# Patient Record
Sex: MALE | Race: OTHER | NOT HISPANIC OR LATINO | URBAN - METROPOLITAN AREA
[De-identification: names, ages, dates, MRNs, and addresses within clinical notes are randomized per-mention and may not be internally consistent; named-entity substitution may affect disease eponyms.]

---

## 2020-07-10 ENCOUNTER — INPATIENT (INPATIENT)
Facility: HOSPITAL | Age: 50
LOS: 2 days | Discharge: HOME | DRG: 313 | End: 2020-07-13
Attending: INTERNAL MEDICINE | Admitting: INTERNAL MEDICINE
Payer: COMMERCIAL

## 2020-07-10 VITALS
HEART RATE: 90 BPM | RESPIRATION RATE: 17 BRPM | TEMPERATURE: 98 F | OXYGEN SATURATION: 99 % | WEIGHT: 167.99 LBS | SYSTOLIC BLOOD PRESSURE: 153 MMHG | HEIGHT: 66 IN | DIASTOLIC BLOOD PRESSURE: 93 MMHG

## 2020-07-10 DIAGNOSIS — I20.9 ANGINA PECTORIS, UNSPECIFIED: ICD-10-CM

## 2020-07-10 DIAGNOSIS — R20.0 ANESTHESIA OF SKIN: ICD-10-CM

## 2020-07-10 DIAGNOSIS — Z98.890 OTHER SPECIFIED POSTPROCEDURAL STATES: Chronic | ICD-10-CM

## 2020-07-10 DIAGNOSIS — I24.9 ACUTE ISCHEMIC HEART DISEASE, UNSPECIFIED: ICD-10-CM

## 2020-07-10 DIAGNOSIS — Z29.9 ENCOUNTER FOR PROPHYLACTIC MEASURES, UNSPECIFIED: ICD-10-CM

## 2020-07-10 LAB
ALBUMIN SERPL ELPH-MCNC: 3.7 G/DL — SIGNIFICANT CHANGE UP (ref 3.5–5)
ALP SERPL-CCNC: 61 U/L — SIGNIFICANT CHANGE UP (ref 40–120)
ALT FLD-CCNC: 36 U/L DA — SIGNIFICANT CHANGE UP (ref 10–60)
ANION GAP SERPL CALC-SCNC: 6 MMOL/L — SIGNIFICANT CHANGE UP (ref 5–17)
APTT BLD: 29.7 SEC — SIGNIFICANT CHANGE UP (ref 27.5–35.5)
AST SERPL-CCNC: 23 U/L — SIGNIFICANT CHANGE UP (ref 10–40)
BASOPHILS # BLD AUTO: 0.05 K/UL — SIGNIFICANT CHANGE UP (ref 0–0.2)
BASOPHILS NFR BLD AUTO: 0.8 % — SIGNIFICANT CHANGE UP (ref 0–2)
BILIRUB SERPL-MCNC: 0.5 MG/DL — SIGNIFICANT CHANGE UP (ref 0.2–1.2)
BUN SERPL-MCNC: 14 MG/DL — SIGNIFICANT CHANGE UP (ref 7–18)
CALCIUM SERPL-MCNC: 8.3 MG/DL — LOW (ref 8.4–10.5)
CHLORIDE SERPL-SCNC: 107 MMOL/L — SIGNIFICANT CHANGE UP (ref 96–108)
CK MB BLD-MCNC: 0.4 % — SIGNIFICANT CHANGE UP (ref 0–3.5)
CK MB BLD-MCNC: 0.7 % — SIGNIFICANT CHANGE UP (ref 0–3.5)
CK MB CFR SERPL CALC: 1.2 NG/ML — SIGNIFICANT CHANGE UP (ref 0–3.6)
CK MB CFR SERPL CALC: 1.9 NG/ML — SIGNIFICANT CHANGE UP (ref 0–3.6)
CK SERPL-CCNC: 258 U/L — HIGH (ref 35–232)
CK SERPL-CCNC: 283 U/L — HIGH (ref 35–232)
CO2 SERPL-SCNC: 26 MMOL/L — SIGNIFICANT CHANGE UP (ref 22–31)
CREAT SERPL-MCNC: 0.96 MG/DL — SIGNIFICANT CHANGE UP (ref 0.5–1.3)
EOSINOPHIL # BLD AUTO: 0.11 K/UL — SIGNIFICANT CHANGE UP (ref 0–0.5)
EOSINOPHIL NFR BLD AUTO: 1.7 % — SIGNIFICANT CHANGE UP (ref 0–6)
GLUCOSE SERPL-MCNC: 143 MG/DL — HIGH (ref 70–99)
HCT VFR BLD CALC: 42.8 % — SIGNIFICANT CHANGE UP (ref 39–50)
HGB BLD-MCNC: 14.3 G/DL — SIGNIFICANT CHANGE UP (ref 13–17)
IMM GRANULOCYTES NFR BLD AUTO: 0.3 % — SIGNIFICANT CHANGE UP (ref 0–1.5)
INR BLD: 1.08 RATIO — SIGNIFICANT CHANGE UP (ref 0.88–1.16)
LYMPHOCYTES # BLD AUTO: 1.34 K/UL — SIGNIFICANT CHANGE UP (ref 1–3.3)
LYMPHOCYTES # BLD AUTO: 20.5 % — SIGNIFICANT CHANGE UP (ref 13–44)
MCHC RBC-ENTMCNC: 28.2 PG — SIGNIFICANT CHANGE UP (ref 27–34)
MCHC RBC-ENTMCNC: 33.4 GM/DL — SIGNIFICANT CHANGE UP (ref 32–36)
MCV RBC AUTO: 84.4 FL — SIGNIFICANT CHANGE UP (ref 80–100)
MONOCYTES # BLD AUTO: 0.3 K/UL — SIGNIFICANT CHANGE UP (ref 0–0.9)
MONOCYTES NFR BLD AUTO: 4.6 % — SIGNIFICANT CHANGE UP (ref 2–14)
NEUTROPHILS # BLD AUTO: 4.72 K/UL — SIGNIFICANT CHANGE UP (ref 1.8–7.4)
NEUTROPHILS NFR BLD AUTO: 72.1 % — SIGNIFICANT CHANGE UP (ref 43–77)
NRBC # BLD: 0 /100 WBCS — SIGNIFICANT CHANGE UP (ref 0–0)
PLATELET # BLD AUTO: 200 K/UL — SIGNIFICANT CHANGE UP (ref 150–400)
POTASSIUM SERPL-MCNC: 3.7 MMOL/L — SIGNIFICANT CHANGE UP (ref 3.5–5.3)
POTASSIUM SERPL-SCNC: 3.7 MMOL/L — SIGNIFICANT CHANGE UP (ref 3.5–5.3)
PROT SERPL-MCNC: 7.4 G/DL — SIGNIFICANT CHANGE UP (ref 6–8.3)
PROTHROM AB SERPL-ACNC: 12.6 SEC — SIGNIFICANT CHANGE UP (ref 10.6–13.6)
RBC # BLD: 5.07 M/UL — SIGNIFICANT CHANGE UP (ref 4.2–5.8)
RBC # FLD: 13.2 % — SIGNIFICANT CHANGE UP (ref 10.3–14.5)
SODIUM SERPL-SCNC: 139 MMOL/L — SIGNIFICANT CHANGE UP (ref 135–145)
TROPONIN I SERPL-MCNC: <0.015 NG/ML — SIGNIFICANT CHANGE UP (ref 0–0.04)
TROPONIN I SERPL-MCNC: <0.015 NG/ML — SIGNIFICANT CHANGE UP (ref 0–0.04)
WBC # BLD: 6.54 K/UL — SIGNIFICANT CHANGE UP (ref 3.8–10.5)
WBC # FLD AUTO: 6.54 K/UL — SIGNIFICANT CHANGE UP (ref 3.8–10.5)

## 2020-07-10 PROCEDURE — 71046 X-RAY EXAM CHEST 2 VIEWS: CPT | Mod: 26

## 2020-07-10 PROCEDURE — 72125 CT NECK SPINE W/O DYE: CPT | Mod: 26

## 2020-07-10 PROCEDURE — 70450 CT HEAD/BRAIN W/O DYE: CPT | Mod: 26

## 2020-07-10 PROCEDURE — 99285 EMERGENCY DEPT VISIT HI MDM: CPT

## 2020-07-10 RX ORDER — SENNA PLUS 8.6 MG/1
2 TABLET ORAL AT BEDTIME
Refills: 0 | Status: DISCONTINUED | OUTPATIENT
Start: 2020-07-10 | End: 2020-07-13

## 2020-07-10 RX ORDER — METOPROLOL TARTRATE 50 MG
25 TABLET ORAL
Refills: 0 | Status: DISCONTINUED | OUTPATIENT
Start: 2020-07-10 | End: 2020-07-13

## 2020-07-10 RX ORDER — OXYCODONE AND ACETAMINOPHEN 5; 325 MG/1; MG/1
1 TABLET ORAL EVERY 6 HOURS
Refills: 0 | Status: DISCONTINUED | OUTPATIENT
Start: 2020-07-10 | End: 2020-07-13

## 2020-07-10 RX ORDER — ACETAMINOPHEN 500 MG
650 TABLET ORAL EVERY 6 HOURS
Refills: 0 | Status: DISCONTINUED | OUTPATIENT
Start: 2020-07-10 | End: 2020-07-13

## 2020-07-10 RX ORDER — ENOXAPARIN SODIUM 100 MG/ML
40 INJECTION SUBCUTANEOUS DAILY
Refills: 0 | Status: DISCONTINUED | OUTPATIENT
Start: 2020-07-10 | End: 2020-07-13

## 2020-07-10 RX ORDER — NITROGLYCERIN 6.5 MG
0.4 CAPSULE, EXTENDED RELEASE ORAL ONCE
Refills: 0 | Status: COMPLETED | OUTPATIENT
Start: 2020-07-10 | End: 2020-07-10

## 2020-07-10 RX ORDER — ATORVASTATIN CALCIUM 80 MG/1
40 TABLET, FILM COATED ORAL AT BEDTIME
Refills: 0 | Status: DISCONTINUED | OUTPATIENT
Start: 2020-07-10 | End: 2020-07-13

## 2020-07-10 RX ORDER — ASPIRIN/CALCIUM CARB/MAGNESIUM 324 MG
162 TABLET ORAL ONCE
Refills: 0 | Status: COMPLETED | OUTPATIENT
Start: 2020-07-10 | End: 2020-07-10

## 2020-07-10 RX ORDER — ASPIRIN/CALCIUM CARB/MAGNESIUM 324 MG
81 TABLET ORAL DAILY
Refills: 0 | Status: DISCONTINUED | OUTPATIENT
Start: 2020-07-11 | End: 2020-07-13

## 2020-07-10 RX ADMIN — ATORVASTATIN CALCIUM 40 MILLIGRAM(S): 80 TABLET, FILM COATED ORAL at 22:07

## 2020-07-10 RX ADMIN — Medication 25 MILLIGRAM(S): at 17:45

## 2020-07-10 RX ADMIN — OXYCODONE AND ACETAMINOPHEN 1 TABLET(S): 5; 325 TABLET ORAL at 16:31

## 2020-07-10 RX ADMIN — Medication 162 MILLIGRAM(S): at 11:10

## 2020-07-10 RX ADMIN — Medication 0.4 MILLIGRAM(S): at 11:10

## 2020-07-10 NOTE — ED PROVIDER NOTE - PROGRESS NOTE DETAILS
JULIET: Patient resting comfortably, feels moderately improved. chest pain resolved after nitroglycerin, though still mild back pain. Will admit for cardiac w/u.

## 2020-07-10 NOTE — H&P ADULT - PROBLEM SELECTOR PLAN 2
IMPROVE VTE Individual Risk Assessment    RISK                                                                Points  [  ] Previous VTE                                                  3    [  ] Thrombophilia                                               2    [  ] Lower limb paralysis                                      2       (unable to hold up >15 seconds)      [  ] Current Cancer                                              2         (within 6 months)  [x  ] Immobilization > 24 hrs                                1  [  ] ICU/CCU stay > 24 hours                              1  [  ] Age > 60                                                      1  IMPROVE VTE Score : 2  Will start him on Lovenox for DVT PPx for prolong immobilization Patient complaining of L arm numbness.  will Rule out ACS.  follow CT head and cervical spine .

## 2020-07-10 NOTE — H&P ADULT - ASSESSMENT
50 y M with no significant PMHx and PSHx of R inguinal hernia repair presented to ED with L sided chest pain since yesterday am. Pt states he was resting when he started having L sided CP, constant , sharp , with radiation to back, currently 8/10

## 2020-07-10 NOTE — ED PROVIDER NOTE - ATTENDING CONTRIBUTION TO CARE
k I performed a face to face bedside interview with this patient regarding history of present illness, review of symptoms and past medical, social and family history.  I completed an independent physical examination.  I have independently reviewed any laboratory or radiologic studies. I have reviewed the resident's documentation and discussed the patient’s plan of care and disposition with the resident. I have documented any discrepancies between the resident's evaluation and my own.     Patient with chest pain starting yesterday, intermittent, left-sided, radiating to back and left arm. Worse with exertion. No sob, ap, n/v/d, dipahoresis, fever, cough. Father  of MI at age 62. Gen: Well-developed, well-nourished, NAD, VS as noted by nursing. HEENT: NCAT, mmm   Chest: RRR, nl S1 and S2, no m/r/g. Resp: CTAB, no w/r/r  Abd: nl BS, soft, nt/nd. Ext: Warm, dry  Neuro: CN II-XII intact, normal and equal strength, sensation, and reflexes bilaterally, normal gait  Psych: AAOx3  MDM: Patient with chest pain radiating to left arm and back. Will check labs, CE, CXR, reassess.

## 2020-07-10 NOTE — H&P ADULT - HISTORY OF PRESENT ILLNESS
50 y M with no significant PMHx and PSHx of R inguinal hernia repair presented to ED with L sided chest pain since yesterday am. Pt states he was resting when he started having L sided CP, constant , sharp , with radiation to back, currently 8/10 . Pt also c/o L arm numbness along the back of the arm travelling to the L little finger. Pt went to ER in [place] yesterday and was given IV pain medication and was discharged without any tests. Pt states his pain didn't go away so he came to the ED today. pt denies any SOB,MOTT, palpitations, nausea , sweating, HA, fever, cough, abd pain, n/v/d, constipation.  	Pt took asp 500 mg this am   Pt routinely doesn't take any medications . No NKDA . No smoking/ drug , drinks beer ocassionall 50 y M with no significant PMHx and PSHx of R inguinal hernia repair presented to ED with L sided chest pain since yesterday am. Pt states he was resting when he started having L sided CP, constant , sharp , with radiation to back, currently 8/10 . Pt also complained of L arm numbness. Pt went to ER in [place] yesterday and was given IV pain medication and was discharged without any tests. Pt states his pain didn't go away so he came to the ED today. Patient denies any SOB,  palpitations, nausea , sweating,  fever, cough, head ache , abd pain, n/v/d, constipation. Pt took asp 500 mg this am   Pt routinely doesn't take any medications .

## 2020-07-10 NOTE — H&P ADULT - ATTENDING COMMENTS
Seen and examined . Agree with above A/P . Trop x2 negative . TTE and NST pending . CT Head and Cx spine noted. Cariology consulted.

## 2020-07-10 NOTE — H&P ADULT - PROBLEM SELECTOR PLAN 1
Patient presented with chets pain.  relieved with nitroglycerin.  Trop 1-ve, follow T2.  EKG -ve for ischemic changes, RBBB.  will monitor the patient on telemetry  Echo ordered.  Follow HbA1c, Lipid Panel, TSH.  will start the patient on Aspirin, statin and bblocker.  Cardiology Consult. Patient presented with chets pain.  relieved with nitroglycerin.  Trop 1-ve, follow T2.  EKG -ve for ischemic changes, RBBB.  will monitor the patient on telemetry  Echo ordered.  Follow HbA1c, Lipid Panel, TSH.  will start the patient on Aspirin, statin and bblocker.  Cardiology Consulted Dr Corral

## 2020-07-10 NOTE — H&P ADULT - PROBLEM SELECTOR PLAN 3
IMPROVE VTE Individual Risk Assessment    RISK                                                                Points  [  ] Previous VTE                                                  3    [  ] Thrombophilia                                               2    [  ] Lower limb paralysis                                      2       (unable to hold up >15 seconds)      [  ] Current Cancer                                              2         (within 6 months)  [x  ] Immobilization > 24 hrs                                1  [  ] ICU/CCU stay > 24 hours                              1  [  ] Age > 60                                                      1  IMPROVE VTE Score : 2  Will start him on Lovenox for DVT PPx for prolong immobilization

## 2020-07-10 NOTE — ED PROVIDER NOTE - CLINICAL SUMMARY MEDICAL DECISION MAKING FREE TEXT BOX
50 y M with no past history came with L sided CP with radiation to back and L arm numbness since yesterday. Will do labs( cbc,cmp , CE ) and CXR . Will give Asp 162 mg x 1 and nitrostat and reassess 50 y M with no past history came with L sided CP with radiation to back and L arm numbness since yesterday. Will do labs( cbc,cmp , CE ) and CXR . Will give Asp 162 mg x 1 and nitrostat . CP resolved following nitrostat. Trop 1 -ve , . Will admit to tele for ACS r/o

## 2020-07-10 NOTE — ED ADULT NURSE NOTE - NSIMPLEMENTINTERV_GEN_ALL_ED
Implemented All Universal Safety Interventions:  [place] to call system. Call bell, personal items and telephone within reach. Instruct patient to call for assistance. Room bathroom lighting operational. Non-slip footwear when patient is off stretcher. Physically safe environment: no spills, clutter or unnecessary equipment. Stretcher in lowest position, wheels locked, appropriate side rails in place.

## 2020-07-10 NOTE — ED PROVIDER NOTE - OBJECTIVE STATEMENT
50 y M with no significant PMHx and PSHx of R inguinal hernia repair presented to ED with L sided chest pain since yesterday am. Pt states he was resting when he started having L sided CP, constant , sharp , with radiation to back, currently 8/10 . Pt also c/o L arm numbness along the back of the arm travelling to the L little finger. Pt went to ER in [place] yesterday and was given IV pain medication and was discharged without any tests. Pt states his pain didn't go away so he came to the ED today. pt denies any SOB, palpitations, nausea , sweating, HA, fever, cough, abd pain, n/v/d, constipation.   Pt took asp 500 mg this am   Pt routinely doesn't take any medications . No NKDA . No smoking/ drug , drinks beer ocassionally 50 y M with no significant PMHx and PSHx of R inguinal hernia repair presented to ED with L sided chest pain since yesterday am. Pt states he was resting when he started having L sided CP, constant , sharp , with radiation to back, currently 8/10 . Pt also c/o L arm numbness along the back of the arm travelling to the L little finger. Pt went to ER in [place] yesterday and was given IV pain medication and was discharged without any tests. Pt states his pain didn't go away so he came to the ED today. pt denies any SOB,MOTT, palpitations, nausea , sweating, HA, fever, cough, abd pain, n/v/d, constipation.   Pt took asp 500 mg this am   Pt routinely doesn't take any medications . No NKDA . No smoking/ drug , drinks beer ocassionally

## 2020-07-10 NOTE — H&P ADULT - NSHPPHYSICALEXAM_GEN_ALL_CORE
Vital Signs Last 24 Hrs  T(C): 36.8 (10 Jul 2020 10:23), Max: 36.8 (10 Jul 2020 10:23)  T(F): 98.2 (10 Jul 2020 10:23), Max: 98.2 (10 Jul 2020 10:23)  HR: 90 (10 Jul 2020 10:23) (90 - 90)  BP: 153/93 (10 Jul 2020 10:23) (153/93 - 153/93)  BP(mean): --  RR: 17 (10 Jul 2020 10:23) (17 - 17)  SpO2: 99% (10 Jul 2020 10:23) (99% - 99%)    CONSTITUTIONAL: Well appearing,  awake, alert and in no apparent distress  ENMT: Airway patent, Nasal mucosa clear. Mouth with normal mucosa. Throat has no vesicles, no oropharyngeal exudates and uvula is midline.  EYES: Clear bilaterally, pupils equal, round and reactive to light. EOMI.  CARDIAC: Normal rate, regular rhythm.  Heart sounds S1, S2.  No murmurs, rubs or gallops   RESPIRATORY: Breath sounds clear and equal bilaterally.   MUSCULOSKELETAL: Spine appears normal, range of motion is not limited, no muscle or joint tenderness  EXTREMITIES: No edema, cyanosis or deformity  ABDOMEN: soft, non tender, non distended.   NEUROLOGICAL: Alert and oriented, no focal deficits, no motor or sensory deficits.  SKIN: No rash, skin turgor

## 2020-07-11 LAB
A1C WITH ESTIMATED AVERAGE GLUCOSE RESULT: 5.9 % — HIGH (ref 4–5.6)
ALBUMIN SERPL ELPH-MCNC: 3.6 G/DL — SIGNIFICANT CHANGE UP (ref 3.5–5)
ALP SERPL-CCNC: 59 U/L — SIGNIFICANT CHANGE UP (ref 40–120)
ALT FLD-CCNC: 35 U/L DA — SIGNIFICANT CHANGE UP (ref 10–60)
ANION GAP SERPL CALC-SCNC: 7 MMOL/L — SIGNIFICANT CHANGE UP (ref 5–17)
AST SERPL-CCNC: 19 U/L — SIGNIFICANT CHANGE UP (ref 10–40)
BASOPHILS # BLD AUTO: 0.07 K/UL — SIGNIFICANT CHANGE UP (ref 0–0.2)
BASOPHILS NFR BLD AUTO: 1.1 % — SIGNIFICANT CHANGE UP (ref 0–2)
BILIRUB SERPL-MCNC: 0.6 MG/DL — SIGNIFICANT CHANGE UP (ref 0.2–1.2)
BUN SERPL-MCNC: 13 MG/DL — SIGNIFICANT CHANGE UP (ref 7–18)
CALCIUM SERPL-MCNC: 8.6 MG/DL — SIGNIFICANT CHANGE UP (ref 8.4–10.5)
CHLORIDE SERPL-SCNC: 106 MMOL/L — SIGNIFICANT CHANGE UP (ref 96–108)
CHOLEST SERPL-MCNC: 190 MG/DL — SIGNIFICANT CHANGE UP (ref 10–199)
CK MB BLD-MCNC: 0.5 % — SIGNIFICANT CHANGE UP (ref 0–3.5)
CK MB CFR SERPL CALC: 1.2 NG/ML — SIGNIFICANT CHANGE UP (ref 0–3.6)
CK SERPL-CCNC: 241 U/L — HIGH (ref 35–232)
CO2 SERPL-SCNC: 25 MMOL/L — SIGNIFICANT CHANGE UP (ref 22–31)
CREAT SERPL-MCNC: 0.96 MG/DL — SIGNIFICANT CHANGE UP (ref 0.5–1.3)
EOSINOPHIL # BLD AUTO: 0.18 K/UL — SIGNIFICANT CHANGE UP (ref 0–0.5)
EOSINOPHIL NFR BLD AUTO: 2.7 % — SIGNIFICANT CHANGE UP (ref 0–6)
ESTIMATED AVERAGE GLUCOSE: 123 MG/DL — HIGH (ref 68–114)
FOLATE SERPL-MCNC: 14.9 NG/ML — SIGNIFICANT CHANGE UP
GLUCOSE SERPL-MCNC: 89 MG/DL — SIGNIFICANT CHANGE UP (ref 70–99)
HCT VFR BLD CALC: 43.7 % — SIGNIFICANT CHANGE UP (ref 39–50)
HDLC SERPL-MCNC: 34 MG/DL — LOW
HGB BLD-MCNC: 14.1 G/DL — SIGNIFICANT CHANGE UP (ref 13–17)
IMM GRANULOCYTES NFR BLD AUTO: 0.5 % — SIGNIFICANT CHANGE UP (ref 0–1.5)
LIPID PNL WITH DIRECT LDL SERPL: 118 MG/DL — SIGNIFICANT CHANGE UP
LYMPHOCYTES # BLD AUTO: 2.09 K/UL — SIGNIFICANT CHANGE UP (ref 1–3.3)
LYMPHOCYTES # BLD AUTO: 31.7 % — SIGNIFICANT CHANGE UP (ref 13–44)
MAGNESIUM SERPL-MCNC: 2 MG/DL — SIGNIFICANT CHANGE UP (ref 1.6–2.6)
MCHC RBC-ENTMCNC: 27.9 PG — SIGNIFICANT CHANGE UP (ref 27–34)
MCHC RBC-ENTMCNC: 32.3 GM/DL — SIGNIFICANT CHANGE UP (ref 32–36)
MCV RBC AUTO: 86.4 FL — SIGNIFICANT CHANGE UP (ref 80–100)
MONOCYTES # BLD AUTO: 0.38 K/UL — SIGNIFICANT CHANGE UP (ref 0–0.9)
MONOCYTES NFR BLD AUTO: 5.8 % — SIGNIFICANT CHANGE UP (ref 2–14)
NEUTROPHILS # BLD AUTO: 3.84 K/UL — SIGNIFICANT CHANGE UP (ref 1.8–7.4)
NEUTROPHILS NFR BLD AUTO: 58.2 % — SIGNIFICANT CHANGE UP (ref 43–77)
NRBC # BLD: 0 /100 WBCS — SIGNIFICANT CHANGE UP (ref 0–0)
PHOSPHATE SERPL-MCNC: 3.2 MG/DL — SIGNIFICANT CHANGE UP (ref 2.5–4.5)
PLATELET # BLD AUTO: 214 K/UL — SIGNIFICANT CHANGE UP (ref 150–400)
POTASSIUM SERPL-MCNC: 4.1 MMOL/L — SIGNIFICANT CHANGE UP (ref 3.5–5.3)
POTASSIUM SERPL-SCNC: 4.1 MMOL/L — SIGNIFICANT CHANGE UP (ref 3.5–5.3)
PROT SERPL-MCNC: 7.3 G/DL — SIGNIFICANT CHANGE UP (ref 6–8.3)
RBC # BLD: 5.06 M/UL — SIGNIFICANT CHANGE UP (ref 4.2–5.8)
RBC # FLD: 13.2 % — SIGNIFICANT CHANGE UP (ref 10.3–14.5)
SARS-COV-2 IGG SERPL QL IA: NEGATIVE — SIGNIFICANT CHANGE UP
SARS-COV-2 IGM SERPL IA-ACNC: <0.1 INDEX — SIGNIFICANT CHANGE UP
SARS-COV-2 RNA SPEC QL NAA+PROBE: SIGNIFICANT CHANGE UP
SODIUM SERPL-SCNC: 138 MMOL/L — SIGNIFICANT CHANGE UP (ref 135–145)
TOTAL CHOLESTEROL/HDL RATIO MEASUREMENT: 5.6 RATIO — SIGNIFICANT CHANGE UP (ref 3.4–9.6)
TRIGL SERPL-MCNC: 191 MG/DL — HIGH (ref 10–149)
TROPONIN I SERPL-MCNC: <0.015 NG/ML — SIGNIFICANT CHANGE UP (ref 0–0.04)
TSH SERPL-MCNC: 0.77 UU/ML — SIGNIFICANT CHANGE UP (ref 0.34–4.82)
VIT B12 SERPL-MCNC: 521 PG/ML — SIGNIFICANT CHANGE UP (ref 232–1245)
VIT D25+D1,25 OH+D1,25 PNL SERPL-MCNC: 81.6 PG/ML — HIGH (ref 19.9–79.3)
WBC # BLD: 6.59 K/UL — SIGNIFICANT CHANGE UP (ref 3.8–10.5)
WBC # FLD AUTO: 6.59 K/UL — SIGNIFICANT CHANGE UP (ref 3.8–10.5)

## 2020-07-11 PROCEDURE — 71275 CT ANGIOGRAPHY CHEST: CPT | Mod: 26

## 2020-07-11 RX ORDER — CYCLOBENZAPRINE HYDROCHLORIDE 10 MG/1
5 TABLET, FILM COATED ORAL THREE TIMES A DAY
Refills: 0 | Status: DISCONTINUED | OUTPATIENT
Start: 2020-07-11 | End: 2020-07-13

## 2020-07-11 RX ORDER — LIDOCAINE 4 G/100G
1 CREAM TOPICAL DAILY
Refills: 0 | Status: DISCONTINUED | OUTPATIENT
Start: 2020-07-11 | End: 2020-07-13

## 2020-07-11 RX ORDER — CYCLOBENZAPRINE HYDROCHLORIDE 10 MG/1
5 TABLET, FILM COATED ORAL ONCE
Refills: 0 | Status: COMPLETED | OUTPATIENT
Start: 2020-07-11 | End: 2020-07-11

## 2020-07-11 RX ORDER — KETOROLAC TROMETHAMINE 30 MG/ML
30 SYRINGE (ML) INJECTION ONCE
Refills: 0 | Status: DISCONTINUED | OUTPATIENT
Start: 2020-07-11 | End: 2020-07-11

## 2020-07-11 RX ADMIN — OXYCODONE AND ACETAMINOPHEN 1 TABLET(S): 5; 325 TABLET ORAL at 20:49

## 2020-07-11 RX ADMIN — SENNA PLUS 2 TABLET(S): 8.6 TABLET ORAL at 21:04

## 2020-07-11 RX ADMIN — Medication 25 MILLIGRAM(S): at 17:11

## 2020-07-11 RX ADMIN — LIDOCAINE 1 PATCH: 4 CREAM TOPICAL at 19:49

## 2020-07-11 RX ADMIN — LIDOCAINE 1 PATCH: 4 CREAM TOPICAL at 13:24

## 2020-07-11 RX ADMIN — Medication 30 MILLIGRAM(S): at 22:17

## 2020-07-11 RX ADMIN — ENOXAPARIN SODIUM 40 MILLIGRAM(S): 100 INJECTION SUBCUTANEOUS at 11:41

## 2020-07-11 RX ADMIN — Medication 25 MILLIGRAM(S): at 05:28

## 2020-07-11 RX ADMIN — CYCLOBENZAPRINE HYDROCHLORIDE 5 MILLIGRAM(S): 10 TABLET, FILM COATED ORAL at 13:24

## 2020-07-11 RX ADMIN — ATORVASTATIN CALCIUM 40 MILLIGRAM(S): 80 TABLET, FILM COATED ORAL at 21:04

## 2020-07-11 RX ADMIN — Medication 81 MILLIGRAM(S): at 11:41

## 2020-07-11 NOTE — PROGRESS NOTE ADULT - SUBJECTIVE AND OBJECTIVE BOX
INTERVAL HPI/OVERNIGHT EVENTS: I still have upper back pain .   Vital Signs Last 24 Hrs  T(C): 36.8 (11 Jul 2020 11:33), Max: 37.1 (10 Jul 2020 19:00)  T(F): 98.3 (11 Jul 2020 11:33), Max: 98.8 (10 Jul 2020 19:00)  HR: 62 (11 Jul 2020 11:33) (58 - 65)  BP: 142/92 (11 Jul 2020 11:33) (116/73 - 142/92)  BP(mean): --  RR: 18 (11 Jul 2020 11:33) (17 - 18)  SpO2: 99% (11 Jul 2020 11:33) (98% - 99%)  I&O's Summary    MEDICATIONS  (STANDING):  aspirin  chewable 81 milliGRAM(s) Oral daily  atorvastatin 40 milliGRAM(s) Oral at bedtime  enoxaparin Injectable 40 milliGRAM(s) SubCutaneous daily  metoprolol tartrate 25 milliGRAM(s) Oral two times a day  senna 2 Tablet(s) Oral at bedtime    MEDICATIONS  (PRN):  acetaminophen   Tablet .. 650 milliGRAM(s) Oral every 6 hours PRN Moderate Pain (4 - 6)  oxycodone    5 mG/acetaminophen 325 mG 1 Tablet(s) Oral every 6 hours PRN Severe Pain (7 - 10)    LABS:                        14.1   6.59  )-----------( 214      ( 11 Jul 2020 07:13 )             43.7     07-11    138  |  106  |  13  ----------------------------<  89  4.1   |  25  |  0.96    Ca    8.6      11 Jul 2020 07:13  Phos  3.2     07-11  Mg     2.0     07-11    TPro  7.3  /  Alb  3.6  /  TBili  0.6  /  DBili  x   /  AST  19  /  ALT  35  /  AlkPhos  59  07-11    PT/INR - ( 10 Jul 2020 11:25 )   PT: 12.6 sec;   INR: 1.08 ratio         PTT - ( 10 Jul 2020 11:25 )  PTT:29.7 sec    CAPILLARY BLOOD GLUCOSE              REVIEW OF SYSTEMS:  CONSTITUTIONAL: No fever, weight loss, or fatigue  EYES: No eye pain, visual disturbances, or discharge  ENMT:  No difficulty hearing, tinnitus, vertigo; No sinus or throat pain  NECK: No pain or stiffness  BREASTS: No pain, masses, or nipple discharge  RESPIRATORY: No cough, wheezing, chills or hemoptysis; No shortness of breath  CARDIOVASCULAR: No chest pain, palpitations, dizziness, or leg swelling  GASTROINTESTINAL: No abdominal or epigastric pain. No nausea, vomiting, or hematemesis; No diarrhea or constipation. No melena or hematochezia.  GENITOURINARY: No dysuria, frequency, hematuria, or incontinence  NEUROLOGICAL: No headaches, memory loss, loss of strength, numbness, or tremors  SKIN: No itching, burning, rashes, or lesions   LYMPH NODES: No enlarged glands  ENDOCRINE: No heat or cold intolerance; No hair loss  MUSCULOSKELETAL: , back, pain  PSYCHIATRIC: No depression, anxiety, mood swings, or difficulty sleeping      RADIOLOGY & ADDITIONAL TESTS:    Consultant(s) Notes Reviewed:  [x ] YES  [ ] NO    PHYSICAL EXAM:  GENERAL: NAD, well-groomed, well-developed,not in any distress ,  HEAD:  Atraumatic, Normocephalic  EYES: EOMI, PERRLA, conjunctiva and sclera clear  ENMT: No tonsillar erythema, exudates, or enlargement; Moist mucous membranes, Good dentition, No lesions  NECK: Supple, No JVD, Normal thyroid  NERVOUS SYSTEM:  Alert & Oriented X3, No focal deficit   CHEST/LUNG: Good air entry bilateral with no  rales, rhonchi, wheezing, or rubs  HEART: Regular rate and rhythm; No murmurs, rubs, or gallops  ABDOMEN: Soft, Nontender, Nondistended; Bowel sounds present  EXTREMITIES:  2+ Peripheral Pulses, No clubbing, cyanosis, or edema  Tenderness upper back . No spine tenderness.     Care Discussed with Consultants/Other Providers [ x] YES  [ ] NO

## 2020-07-11 NOTE — CONSULT NOTE ADULT - ATTENDING COMMENTS
CARDIOLOGY ATTENDING    Agree with above. A/w atypical chest pain. Troponin is negative x 3. EKG shows RBBB (normal variant) but is otherwise normal. CTPA negative for PE. Recommend echo and NST. If echo and NST unremarkable patient may be safely discharged home. CARDIOLOGY ATTENDING    Agree with above. A/w atypical chest pain. Troponin is negative x 3. EKG shows RBBB (normal variant) but is otherwise normal. CTPA negative for PE. Recommend echo and NST. If echo and NST unremarkable patient may be safely discharged home.    Santos Corral MD, [facility]  BEEPER [phone]

## 2020-07-11 NOTE — CONSULT NOTE ADULT - SUBJECTIVE AND OBJECTIVE BOX
HISTORY OF PRESENT ILLNESS:   50 y M with no significant PMHx and PSHx of R inguinal hernia repair presented to ED with L sided chest pain since yesterday am. Pt states he was resting when he started having L sided CP, constant , sharp , with radiation to back, currently 8/10 . Pt also complained of L arm numbness. Pt went to ER in [place] yesterday and was given IV pain medication and was discharged without any tests. Pt states his pain didn't go away so he came to the ED today. Patient denies any SOB,  palpitations, nausea , sweating,  fever, cough, head ache , abd pain, n/v/d, constipation.    Pt routinely doesn't take any medications .      PAST MEDICAL & SURGICAL HISTORY:  No pertinent past medical history  History of right inguinal hernia repair      [ ] Diabetes   [ ] Hypertension  [ ] Hyperlipidemia  [ ] CAD  [ ] PCI  [ ] CABG    PREVIOUS DIAGNOSTIC TESTING:    [ ] Echocardiogram:   [ ]  Catheterization:  [ ] Stress Test:  	    MEDICATIONS:  aspirin  chewable 81 milliGRAM(s) Oral daily  enoxaparin Injectable 40 milliGRAM(s) SubCutaneous daily  metoprolol tartrate 25 milliGRAM(s) Oral two times a day        acetaminophen   Tablet .. 650 milliGRAM(s) Oral every 6 hours PRN  oxycodone    5 mG/acetaminophen 325 mG 1 Tablet(s) Oral every 6 hours PRN    senna 2 Tablet(s) Oral at bedtime    atorvastatin 40 milliGRAM(s) Oral at bedtime        Allergies    No Known Allergies    Intolerances        FAMILY HISTORY:  Family history of hypertension in father      SOCIAL HISTORY:    [ ] Non-smoker  [ ] Smoker  [ ] Alcohol      REVIEW OF SYSTEMS:  [ ]chest pain  [  ]shortness of breath  [  ]palpitations  [  ]syncope  [ ]near syncope [  ]diplopia  [  ]altered mental status   [  ]fevers  [ ]chills [ ]nausea  [ ]vomitting  [ ]abdominal pain  [ ]melena  [ ]BRBPR  [  ]epistaxis  [  ]rash  [  ]lower extremity edema      CONSTITUTIONAL: No fever, weight loss, or fatigue  EYES: No eye pain, visual disturbances, or discharge  ENMT:  No difficulty hearing, tinnitus, vertigo; No sinus or throat pain  NECK: No pain or stiffness  RESPIRATORY: No cough, wheezing, chills or hemoptysis; No Shortness of Breath  CARDIOVASCULAR: No chest pain, palpitations, passing out, dizziness, or leg swelling  GASTROINTESTINAL: No abdominal or epigastric pain. No nausea, vomiting, or hematemesis; No diarrhea or constipation. No melena or hematochezia.  GENITOURINARY: No dysuria, frequency, hematuria, or incontinence  NEUROLOGICAL: No headaches, memory loss, loss of strength, numbness, or tremors  SKIN: No itching, burning, rashes, or lesions   LYMPH Nodes: No enlarged glands  ENDOCRINE: No heat or cold intolerance; No hair loss  MUSCULOSKELETAL: No joint pain or swelling; No muscle, back, or extremity pain  PSYCHIATRIC: No depression, anxiety, mood swings, or difficulty sleeping  HEME/LYMPH: No easy bruising, or bleeding gums  ALLERY AND IMMUNOLOGIC: No hives or eczema	    [ ] All others negative	  [ ] Unable to obtain    PHYSICAL EXAM:  T(C): 36.8 (07-11-20 @ 09:01), Max: 37.1 (07-10-20 @ 19:00)  HR: 58 (07-11-20 @ 09:01) (58 - 90)  BP: 116/73 (07-11-20 @ 09:01) (116/73 - 153/93)  RR: 17 (07-11-20 @ 09:01) (17 - 18)  SpO2: 99% (07-11-20 @ 09:01) (98% - 99%)  Wt(kg): --  I&O's Summary      Appearance: Normal	  HEENT:   Normal oral mucosa, PERRL, EOMI	  Lymphatic: No lymphadenopathy  Cardiovascular: Normal S1 S2, No JVD, No murmurs, No edema  Respiratory: Lungs clear to auscultation	  Psychiatry: A & O x 3, Mood & affect appropriate  Gastrointestinal:  Soft, Non-tender, + BS	  Skin: No rashes, No ecchymoses, No cyanosis	  Neurologic: Non-focal  Extremities: Normal range of motion, No clubbing, cyanosis or edema  Vascular: Peripheral pulses palpable 2+ bilaterally    TELEMETRY: 	  NSR   ECG:  	< from: 12 Lead ECG (07.10.20 @ 10:29) >    Diagnosis Line Normal sinus rhythmwith sinus arrhythmia  Right bundle branch block  Abnormal ECG    < end of copied text >    RADIOLOGY:  OTHER: 	  	  LABS:	 	    CARDIAC MARKERS:  Troponin I, Serum: <0.015 ng/mL (07-11 @ 00:43)  Troponin I, Serum: <0.015 ng/mL (07-10 @ 17:28)  Troponin I, Serum: <0.015 ng/mL (07-10 @ 11:25)                          14.1   6.59  )-----------( 214      ( 11 Jul 2020 07:13 )             43.7     07-11    138  |  106  |  13  ----------------------------<  89  4.1   |  25  |  0.96    Ca    8.6      11 Jul 2020 07:13  Phos  3.2     07-11  Mg     2.0     07-11    TPro  7.3  /  Alb  3.6  /  TBili  0.6  /  DBili  x   /  AST  19  /  ALT  35  /  AlkPhos  59  07-11    proBNP:   Lipid Profile:   HgA1c:   TSH: Thyroid Stimulating Hormone, Serum: 0.77 uU/mL (07-11 @ 07:13)      ASSESSMENT/PLAN: 	50 y M with no significant PMHx and PSHx of R inguinal hernia repair presented to ED with L sided chest pain.    Cardiac markers neg   ECG with out any sig ischemic finding   ECHO ordered , would obtain Stress test to evaluate for ischemia   Tele stable overnight   cont asa, statin , bb ,  no s/s of chf on exam   further plan post above. HISTORY OF PRESENT ILLNESS:   50 y M with no significant PMHx and PSHx of R inguinal hernia repair presented to ED with L sided chest pain since yesterday am. Pt states he was resting when he started having L sided CP, constant , sharp , with radiation to back, currently 8/10 . Pt also complained of L arm numbness. Pt went to ER in [place] yesterday and was given IV pain medication and was discharged without any tests. Pt states his pain didn't go away so he came to the ED today. Patient denies any SOB,  palpitations, nausea , sweating,  fever, cough, head ache , abd pain, n/v/d, constipation.  Pt routinely doesn't take any medications .      PAST MEDICAL & SURGICAL HISTORY:  No pertinent past medical history  History of right inguinal hernia repair      [ ] Diabetes   [ ] Hypertension  [ ] Hyperlipidemia  [ ] CAD  [ ] PCI  [ ] CABG    PREVIOUS DIAGNOSTIC TESTING:    [ ] Echocardiogram:   [ ]  Catheterization:  [ ] Stress Test:  	    MEDICATIONS:  aspirin  chewable 81 milliGRAM(s) Oral daily  enoxaparin Injectable 40 milliGRAM(s) SubCutaneous daily  metoprolol tartrate 25 milliGRAM(s) Oral two times a day        acetaminophen   Tablet .. 650 milliGRAM(s) Oral every 6 hours PRN  oxycodone    5 mG/acetaminophen 325 mG 1 Tablet(s) Oral every 6 hours PRN    senna 2 Tablet(s) Oral at bedtime    atorvastatin 40 milliGRAM(s) Oral at bedtime        Allergies    No Known Allergies    Intolerances        FAMILY HISTORY:  Family history of hypertension in father      SOCIAL HISTORY:    [ ] Non-smoker  [ ] Smoker  [ ] Alcohol      REVIEW OF SYSTEMS:  [ ]chest pain  [  ]shortness of breath  [  ]palpitations  [  ]syncope  [ ]near syncope [  ]diplopia  [  ]altered mental status   [  ]fevers  [ ]chills [ ]nausea  [ ]vomitting  [ ]abdominal pain  [ ]melena  [ ]BRBPR  [  ]epistaxis  [  ]rash  [  ]lower extremity edema      CONSTITUTIONAL: No fever, weight loss, or fatigue  EYES: No eye pain, visual disturbances, or discharge  ENMT:  No difficulty hearing, tinnitus, vertigo; No sinus or throat pain  NECK: No pain or stiffness  RESPIRATORY: No cough, wheezing, chills or hemoptysis; No Shortness of Breath  CARDIOVASCULAR: No chest pain, palpitations, passing out, dizziness, or leg swelling  GASTROINTESTINAL: No abdominal or epigastric pain. No nausea, vomiting, or hematemesis; No diarrhea or constipation. No melena or hematochezia.  GENITOURINARY: No dysuria, frequency, hematuria, or incontinence  NEUROLOGICAL: No headaches, memory loss, loss of strength, numbness, or tremors  SKIN: No itching, burning, rashes, or lesions   LYMPH Nodes: No enlarged glands  ENDOCRINE: No heat or cold intolerance; No hair loss  MUSCULOSKELETAL: No joint pain or swelling; No muscle, back, or extremity pain  PSYCHIATRIC: No depression, anxiety, mood swings, or difficulty sleeping  HEME/LYMPH: No easy bruising, or bleeding gums  ALLERY AND IMMUNOLOGIC: No hives or eczema	    [ ] All others negative	  [ ] Unable to obtain    PHYSICAL EXAM:  T(C): 36.8 (07-11-20 @ 09:01), Max: 37.1 (07-10-20 @ 19:00)  HR: 58 (07-11-20 @ 09:01) (58 - 90)  BP: 116/73 (07-11-20 @ 09:01) (116/73 - 153/93)  RR: 17 (07-11-20 @ 09:01) (17 - 18)  SpO2: 99% (07-11-20 @ 09:01) (98% - 99%)  Wt(kg): --  I&O's Summary      Appearance: Normal	  HEENT:   Normal oral mucosa, PERRL, EOMI	  Lymphatic: No lymphadenopathy  Cardiovascular: Normal S1 S2, No JVD, No murmurs, No edema  Respiratory: Lungs clear to auscultation	  Psychiatry: A & O x 3, Mood & affect appropriate  Gastrointestinal:  Soft, Non-tender, + BS	  Skin: No rashes, No ecchymoses, No cyanosis	  Neurologic: Non-focal  Extremities: Normal range of motion, No clubbing, cyanosis or edema  Vascular: Peripheral pulses palpable 2+ bilaterally    TELEMETRY: 	  NSR   ECG:  	< from: 12 Lead ECG (07.10.20 @ 10:29) >    Diagnosis Line Normal sinus rhythmwith sinus arrhythmia  Right bundle branch block  Abnormal ECG    < end of copied text >    RADIOLOGY:  OTHER: 	  	  LABS:	 	    CARDIAC MARKERS:  Troponin I, Serum: <0.015 ng/mL (07-11 @ 00:43)  Troponin I, Serum: <0.015 ng/mL (07-10 @ 17:28)  Troponin I, Serum: <0.015 ng/mL (07-10 @ 11:25)                          14.1   6.59  )-----------( 214      ( 11 Jul 2020 07:13 )             43.7     07-11    138  |  106  |  13  ----------------------------<  89  4.1   |  25  |  0.96    Ca    8.6      11 Jul 2020 07:13  Phos  3.2     07-11  Mg     2.0     07-11    TPro  7.3  /  Alb  3.6  /  TBili  0.6  /  DBili  x   /  AST  19  /  ALT  35  /  AlkPhos  59  07-11    proBNP:   Lipid Profile:   HgA1c:   TSH: Thyroid Stimulating Hormone, Serum: 0.77 uU/mL (07-11 @ 07:13)      ASSESSMENT/PLAN: 	50 y M with no significant PMHx and PSHx of R inguinal hernia repair presented to ED with L sided chest pain.    Cardiac markers neg   ECG with out any sig ischemic finding   ECHO ordered , would obtain Stress test to evaluate for ischemia   Tele stable overnight   cont asa, statin , bb ,  no s/s of chf on exam   further plan post above.

## 2020-07-11 NOTE — PROGRESS NOTE ADULT - ASSESSMENT
50 y M with no significant PMHx and PSHx of R inguinal hernia repair presented to ED with L sided chest pain since yesterday am. Pt states he was resting when he started having L sided CP, constant , sharp , with radiation to back, currently 8/10      Problem/Plan - 1:  ·  Problem: Chest  Pain .  Plan: Now Chest Pain free   relieved with nitroglycerin.  Trop 1-ve, follow T2.  EKG -ve for ischemic changes, RBBB.  will monitor the patient on telemetry  Echo ordered.  Follow HbA1c, Lipid Panel, TSH.  will start the patient on Aspirin, statin and bblocker.  Cardiology helping.      Problem/Plan - 2:  ·  Problem: Upper back pain and Numbness of left hand.  Plan: Negative CT head and cervical spine .    Will check CTA. Muscle relaxant .     Problem/Plan - 3:  ·  Problem: Prophylactic measure.  Plan:                                                       1  IMPROVE VTE Score : 2  Will start him on Lovenox for DVT PPx for prolong immobilization.

## 2020-07-12 DIAGNOSIS — R20.0 ANESTHESIA OF SKIN: ICD-10-CM

## 2020-07-12 DIAGNOSIS — R07.9 CHEST PAIN, UNSPECIFIED: ICD-10-CM

## 2020-07-12 LAB
ANION GAP SERPL CALC-SCNC: 6 MMOL/L — SIGNIFICANT CHANGE UP (ref 5–17)
BASOPHILS # BLD AUTO: 0.06 K/UL — SIGNIFICANT CHANGE UP (ref 0–0.2)
BASOPHILS NFR BLD AUTO: 1 % — SIGNIFICANT CHANGE UP (ref 0–2)
BUN SERPL-MCNC: 16 MG/DL — SIGNIFICANT CHANGE UP (ref 7–18)
CALCIUM SERPL-MCNC: 8.7 MG/DL — SIGNIFICANT CHANGE UP (ref 8.4–10.5)
CHLORIDE SERPL-SCNC: 106 MMOL/L — SIGNIFICANT CHANGE UP (ref 96–108)
CO2 SERPL-SCNC: 27 MMOL/L — SIGNIFICANT CHANGE UP (ref 22–31)
CREAT SERPL-MCNC: 1.04 MG/DL — SIGNIFICANT CHANGE UP (ref 0.5–1.3)
EOSINOPHIL # BLD AUTO: 0.14 K/UL — SIGNIFICANT CHANGE UP (ref 0–0.5)
EOSINOPHIL NFR BLD AUTO: 2.2 % — SIGNIFICANT CHANGE UP (ref 0–6)
GLUCOSE SERPL-MCNC: 104 MG/DL — HIGH (ref 70–99)
HCT VFR BLD CALC: 45.4 % — SIGNIFICANT CHANGE UP (ref 39–50)
HGB BLD-MCNC: 14.9 G/DL — SIGNIFICANT CHANGE UP (ref 13–17)
IMM GRANULOCYTES NFR BLD AUTO: 0.5 % — SIGNIFICANT CHANGE UP (ref 0–1.5)
LYMPHOCYTES # BLD AUTO: 1.82 K/UL — SIGNIFICANT CHANGE UP (ref 1–3.3)
LYMPHOCYTES # BLD AUTO: 29.1 % — SIGNIFICANT CHANGE UP (ref 13–44)
MAGNESIUM SERPL-MCNC: 2.2 MG/DL — SIGNIFICANT CHANGE UP (ref 1.6–2.6)
MCHC RBC-ENTMCNC: 27.9 PG — SIGNIFICANT CHANGE UP (ref 27–34)
MCHC RBC-ENTMCNC: 32.8 GM/DL — SIGNIFICANT CHANGE UP (ref 32–36)
MCV RBC AUTO: 85 FL — SIGNIFICANT CHANGE UP (ref 80–100)
MONOCYTES # BLD AUTO: 0.39 K/UL — SIGNIFICANT CHANGE UP (ref 0–0.9)
MONOCYTES NFR BLD AUTO: 6.2 % — SIGNIFICANT CHANGE UP (ref 2–14)
NEUTROPHILS # BLD AUTO: 3.82 K/UL — SIGNIFICANT CHANGE UP (ref 1.8–7.4)
NEUTROPHILS NFR BLD AUTO: 61 % — SIGNIFICANT CHANGE UP (ref 43–77)
NRBC # BLD: 0 /100 WBCS — SIGNIFICANT CHANGE UP (ref 0–0)
PHOSPHATE SERPL-MCNC: 4.2 MG/DL — SIGNIFICANT CHANGE UP (ref 2.5–4.5)
PLATELET # BLD AUTO: 218 K/UL — SIGNIFICANT CHANGE UP (ref 150–400)
POTASSIUM SERPL-MCNC: 4.2 MMOL/L — SIGNIFICANT CHANGE UP (ref 3.5–5.3)
POTASSIUM SERPL-SCNC: 4.2 MMOL/L — SIGNIFICANT CHANGE UP (ref 3.5–5.3)
RBC # BLD: 5.34 M/UL — SIGNIFICANT CHANGE UP (ref 4.2–5.8)
RBC # FLD: 13.2 % — SIGNIFICANT CHANGE UP (ref 10.3–14.5)
SODIUM SERPL-SCNC: 139 MMOL/L — SIGNIFICANT CHANGE UP (ref 135–145)
WBC # BLD: 6.26 K/UL — SIGNIFICANT CHANGE UP (ref 3.8–10.5)
WBC # FLD AUTO: 6.26 K/UL — SIGNIFICANT CHANGE UP (ref 3.8–10.5)

## 2020-07-12 RX ORDER — TRAMADOL HYDROCHLORIDE 50 MG/1
50 TABLET ORAL ONCE
Refills: 0 | Status: DISCONTINUED | OUTPATIENT
Start: 2020-07-12 | End: 2020-07-12

## 2020-07-12 RX ADMIN — TRAMADOL HYDROCHLORIDE 50 MILLIGRAM(S): 50 TABLET ORAL at 09:54

## 2020-07-12 NOTE — PROGRESS NOTE ADULT - PROBLEM SELECTOR PLAN 1
relieved with nitroglycerin.  Trop 1-ve, follow T2.  EKG -ve for ischemic changes, RBBB.  will monitor the patient on telemetry  -For Stress Test  -Increased triglycerides  -HbA1c: 5.9%

## 2020-07-12 NOTE — PROGRESS NOTE ADULT - SUBJECTIVE AND OBJECTIVE BOX
INTERVAL HPI/OVERNIGHT EVENTS: I am still having upper back pain with numbness left arm .   Vital Signs Last 24 Hrs  T(C): 37 (12 Jul 2020 11:20), Max: 37 (12 Jul 2020 11:20)  T(F): 98.6 (12 Jul 2020 11:20), Max: 98.6 (12 Jul 2020 11:20)  HR: 60 (12 Jul 2020 11:20) (55 - 98)  BP: 124/76 (12 Jul 2020 11:20) (123/78 - 134/80)  BP(mean): --  RR: 16 (12 Jul 2020 11:20) (16 - 18)  SpO2: 99% (12 Jul 2020 11:20) (98% - 100%)  I&O's Summary    11 Jul 2020 07:01  -  12 Jul 2020 07:00  --------------------------------------------------------  IN: 75 mL / OUT: 400 mL / NET: -325 mL      MEDICATIONS  (STANDING):  aspirin  chewable 81 milliGRAM(s) Oral daily  atorvastatin 40 milliGRAM(s) Oral at bedtime  enoxaparin Injectable 40 milliGRAM(s) SubCutaneous daily  lidocaine   Patch 1 Patch Transdermal daily  metoprolol tartrate 25 milliGRAM(s) Oral two times a day  senna 2 Tablet(s) Oral at bedtime    MEDICATIONS  (PRN):  acetaminophen   Tablet .. 650 milliGRAM(s) Oral every 6 hours PRN Moderate Pain (4 - 6)  cyclobenzaprine 5 milliGRAM(s) Oral three times a day PRN Muscle Spasm  oxycodone    5 mG/acetaminophen 325 mG 1 Tablet(s) Oral every 6 hours PRN Severe Pain (7 - 10)    LABS:                        14.9   6.26  )-----------( 218      ( 12 Jul 2020 06:59 )             45.4     07-12    139  |  106  |  16  ----------------------------<  104<H>  4.2   |  27  |  1.04    Ca    8.7      12 Jul 2020 06:59  Phos  4.2     07-12  Mg     2.2     07-12    TPro  7.3  /  Alb  3.6  /  TBili  0.6  /  DBili  x   /  AST  19  /  ALT  35  /  AlkPhos  59  07-11        CAPILLARY BLOOD GLUCOSE              REVIEW OF SYSTEMS:  CONSTITUTIONAL: No fever, weight loss, or fatigue  EYES: No eye pain, visual disturbances, or discharge  ENMT:  No difficulty hearing, tinnitus, vertigo; No sinus or throat pain  NECK: No pain or stiffness  RESPIRATORY: No cough, wheezing, chills or hemoptysis; No shortness of breath  CARDIOVASCULAR: No chest pain, palpitations, dizziness, or leg swelling  GASTROINTESTINAL: No abdominal or epigastric pain. No nausea, vomiting, or hematemesis; No diarrhea or constipation. No melena or hematochezia.  GENITOURINARY: No dysuria, frequency, hematuria, or incontinence  NEUROLOGICAL: No headaches, memory loss, loss of strength, numbness, or tremors      Consultant(s) Notes Reviewed:  [x ] YES  [ ] NO    PHYSICAL EXAM:  GENERAL: NAD, well-groomed, well-developed, not in any distress ,  HEAD:  Atraumatic, Normocephalic  EYES: EOMI, PERRLA, conjunctiva and sclera clear  ENMT: No tonsillar erythema, exudates, or enlargement; Moist mucous membranes, Good dentition, No lesions  NECK: Supple, No JVD, Normal thyroid  NERVOUS SYSTEM:  Alert & Oriented X3, No focal deficit   CHEST/LUNG: Good air entry bilateral with no  rales, rhonchi, wheezing, or rubs  HEART: Regular rate and rhythm; No murmurs, rubs, or gallops  ABDOMEN: Soft, Nontender, Nondistended; Bowel sounds present  EXTREMITIES:  2+ Peripheral Pulses, No clubbing, cyanosis, or edema  SKIN: No rashes or lesions    Care Discussed with Consultants/Other Providers [ x] YES  [ ] NO

## 2020-07-12 NOTE — PROGRESS NOTE ADULT - SUBJECTIVE AND OBJECTIVE BOX
PGY 1 Note discussed with supervising resident and primary attending    Patient is a 50y old  Male who presents with a chief complaint of Chest Pain (12 Jul 2020 11:19)      INTERVAL HPI/OVERNIGHT EVENTS: no events noted overnight. Patient notes resolution of chest pain with persistence of back pain and left arm pain down to the left hand. Patient notes numbness and tingling sensation on left hand worse on sitting up and standing.     MEDICATIONS  (STANDING):  aspirin  chewable 81 milliGRAM(s) Oral daily  atorvastatin 40 milliGRAM(s) Oral at bedtime  enoxaparin Injectable 40 milliGRAM(s) SubCutaneous daily  lidocaine   Patch 1 Patch Transdermal daily  metoprolol tartrate 25 milliGRAM(s) Oral two times a day  senna 2 Tablet(s) Oral at bedtime    MEDICATIONS  (PRN):  acetaminophen   Tablet .. 650 milliGRAM(s) Oral every 6 hours PRN Moderate Pain (4 - 6)  cyclobenzaprine 5 milliGRAM(s) Oral three times a day PRN Muscle Spasm  oxycodone    5 mG/acetaminophen 325 mG 1 Tablet(s) Oral every 6 hours PRN Severe Pain (7 - 10)      __________________________________________________  REVIEW OF SYSTEMS:  CONSTITUTIONAL: No fever   EYES: no acute visual disturbances  NECK: No pain or stiffness  RESPIRATORY: No cough; No shortness of breath  CARDIOVASCULAR: No chest pain, no palpitations  GASTROINTESTINAL: No pain. No nausea or vomiting; No diarrhea   NEUROLOGICAL: No headache or numbness, no tremors  MUSCULOSKELETAL: No joint pain, no muscle pain  GENITOURINARY: no dysuria, no frequency, no hesitancy  PSYCHIATRY: no depression , no anxiety  ALL OTHER ROS negative        Vital Signs Last 24 Hrs  T(C): 37 (12 Jul 2020 11:20), Max: 37 (12 Jul 2020 11:20)  T(F): 98.6 (12 Jul 2020 11:20), Max: 98.6 (12 Jul 2020 11:20)  HR: 60 (12 Jul 2020 11:20) (55 - 98)  BP: 124/76 (12 Jul 2020 11:20) (123/78 - 134/80)  BP(mean): --  RR: 16 (12 Jul 2020 11:20) (16 - 18)  SpO2: 99% (12 Jul 2020 11:20) (98% - 100%)    ________________________________________________  PHYSICAL EXAM:  GENERAL: NAD  HEENT: Normocephalic;  conjunctivae and sclerae clear; moist mucous membranes;   NECK : supple  CHEST/LUNG: Clear to auscultation bilaterally with good air entry   HEART: S1 S2  regular; no murmurs, gallops or rubs  ABDOMEN: Soft, Nontender, Nondistended; Bowel sounds present  EXTREMITIES: no cyanosis; no edema; no calf tenderness  SKIN: warm and dry; no rash  NERVOUS SYSTEM:  Awake and alert; Oriented  to place, person and time ; no new deficits    _________________________________________________  LABS:                        14.9   6.26  )-----------( 218      ( 12 Jul 2020 06:59 )             45.4     07-12    139  |  106  |  16  ----------------------------<  104<H>  4.2   |  27  |  1.04    Ca    8.7      12 Jul 2020 06:59  Phos  4.2     07-12  Mg     2.2     07-12    TPro  7.3  /  Alb  3.6  /  TBili  0.6  /  DBili  x   /  AST  19  /  ALT  35  /  AlkPhos  59  07-11        CAPILLARY BLOOD GLUCOSE            RADIOLOGY & ADDITIONAL TESTS:  < from: CT Angio Chest w/ IV Cont (07.11.20 @ 10:31) >  IMPRESSION:   No evidence of pulmonary emboli or other acute finding to account for the symptomatology      < end of copied text >    < from: CT Cervical Spine No Cont (07.10.20 @ 13:57) >  IMPRESSION:  No acute intracranial hemorrhage or acute territorial infarct. If pain persists, follow-up MRI exam recommended    No acute fracture cervical spine. Multilevel degenerative changes as described above. Degenerative changes better evaluated on MRI exam.    < end of copied text >    < from: CT Head No Cont (07.10.20 @ 13:57) >    IMPRESSION:  No acute intracranial hemorrhage or acute territorial infarct. If pain persists, follow-up MRI exam recommended    No acute fracture cervical spine. Multilevel degenerative changes as described above. Degenerative changes better evaluated on MRI exam.    < end of copied text >      Imaging Personally Reviewed:  YES    Consultant(s) Notes Reviewed:   YES    Care Discussed with Consultants : YES     Plan of care was discussed with patient and /or primary care giver; all questions and concerns were addressed and care was aligned with patient's wishes.

## 2020-07-12 NOTE — PROGRESS NOTE ADULT - ASSESSMENT
50 y M with no significant PMHx and PSHx of R inguinal hernia repair presented to ED with L sided chest pain since yesterday am. Pt states he was resting when he started having L sided CP, constant , sharp , with radiation to back, currently 8/10      Problem/Plan - 1:  ·  Problem: Chest  Pain .  Plan: Now Chest Pain free   relieved with nitroglycerin.  Trop 1-ve, follow T2.  EKG -ve for ischemic changes, RBBB.  will monitor the patient on telemetry  Echo < from: Transthoracic Echocardiogram (07.11.20 @ 12:45) >  CONCLUSIONS:  1. Normal mitral valve. Trace mitral regurgitation.  2. Normal trileaflet aortic valve.  3. Aortic Root: 3.5 cm.    4. Normal left atrium.  LA volume index = 19 cc/m2.  5. Normal left ventricular internal dimensions and wall  thicknesses.  6. Normal Left Ventricular Systolic Function,  (EF = 55 to  60%)  7. Normal diastolic function.  8. Normal right atrium.  9. Normal right ventricular size and systolic function  (TAPSE  2.1cm).  10. RV systolic pressure is normal at  19 mm Hg.  11. There is trace tricuspid regurgitation.  12. Normal pulmonic valve.  13. Normal pericardium with no pericardial effusion.    Awaiting NST   Follow HbA1c, Lipid Panel, TSH.  will start the patient on Aspirin, statin and bblocker.  Cardiology helping.      Problem/Plan - 2:  ·  Problem: Upper back pain and Numbness of left hand.  Plan: Negative CT head and cervical spine .   No PE on  CTA. Muscle relaxant .  Awaiting MRI Cervical and Dorsal spine.      Problem/Plan - 3:  ·  Problem: Prophylactic measure.  Plan:                                                       1  IMPROVE VTE Score : 2  Will start him on Lovenox for DVT PPx for prolong immobilization.     Disposition : DC planning pending above.

## 2020-07-12 NOTE — PROGRESS NOTE ADULT - SUBJECTIVE AND OBJECTIVE BOX
Subjective: pt seen and examined, no complaints, ROS - .        acetaminophen   Tablet .. 650 milliGRAM(s) Oral every 6 hours PRN  aspirin  chewable 81 milliGRAM(s) Oral daily  atorvastatin 40 milliGRAM(s) Oral at bedtime  cyclobenzaprine 5 milliGRAM(s) Oral three times a day PRN  enoxaparin Injectable 40 milliGRAM(s) SubCutaneous daily  lidocaine   Patch 1 Patch Transdermal daily  metoprolol tartrate 25 milliGRAM(s) Oral two times a day  oxycodone    5 mG/acetaminophen 325 mG 1 Tablet(s) Oral every 6 hours PRN  senna 2 Tablet(s) Oral at bedtime                            14.9   6.26  )-----------( 218      ( 12 Jul 2020 06:59 )             45.4       Hemoglobin: 14.9 g/dL (07-12 @ 06:59)  Hemoglobin: 14.1 g/dL (07-11 @ 07:13)  Hemoglobin: 14.3 g/dL (07-10 @ 11:25)      07-12    139  |  106  |  16  ----------------------------<  104<H>  4.2   |  27  |  1.04    Ca    8.7      12 Jul 2020 06:59  Phos  4.2     07-12  Mg     2.2     07-12    TPro  7.3  /  Alb  3.6  /  TBili  0.6  /  DBili  x   /  AST  19  /  ALT  35  /  AlkPhos  59  07-11    Creatinine Trend: 1.04<--, 0.96<--, 0.96<--    COAGS:     CARDIAC MARKERS ( 11 Jul 2020 00:43 )  <0.015 ng/mL / x     / 241 U/L / x     / 1.2 ng/mL  CARDIAC MARKERS ( 10 Jul 2020 17:28 )  <0.015 ng/mL / x     / 258 U/L / x     / 1.9 ng/mL  CARDIAC MARKERS ( 10 Jul 2020 11:25 )  <0.015 ng/mL / x     / 283 U/L / x     / 1.2 ng/mL        T(C): 36.6 (07-12-20 @ 08:00), Max: 36.8 (07-11-20 @ 09:01)  HR: 55 (07-12-20 @ 08:00) (55 - 98)  BP: 129/87 (07-12-20 @ 08:00) (116/73 - 142/92)  RR: 16 (07-12-20 @ 08:00) (16 - 18)  SpO2: 100% (07-12-20 @ 08:00) (98% - 100%)  Wt(kg): --    I&O's Summary    11 Jul 2020 07:01  -  12 Jul 2020 07:00  --------------------------------------------------------  IN: 75 mL / OUT: 400 mL / NET: -325 mL        Appearance: Normal	  HEENT:   Normal oral mucosa, PERRL, EOMI	  Lymphatic: No lymphadenopathy , no edema  Cardiovascular: Normal S1 S2, No JVD, No murmurs , Peripheral pulses palpable 2+ bilaterally  Respiratory: Lungs clear to auscultation, normal effort 	  Gastrointestinal:  Soft, Non-tender, + BS	  Skin: No rashes, No ecchymoses, No cyanosis, warm to touch  Musculoskeletal: Normal range of motion, normal strength  Psychiatry:  Mood & affect appropriate    TELEMETRY: nsr , sb 	    ECG:  	  RADIOLOGY:   DIAGNOSTIC TESTING:  [ ] Echocardiogram:  [ ]  Catheterization:  [ ] Stress Test:    OTHER: 	        ASSESSMENT/PLAN:   50 y M with no significant PMHx and PSHx of R inguinal hernia repair presented to ED with L sided chest pain.    Cardiac markers neg   Tele stable overnight   ECHO ordered , would obtain Stress test to evaluate for ischemia  , further Cardiac plans based on finding    cont asa, statin , bb ,  no s/s of chf on exam

## 2020-07-13 ENCOUNTER — TRANSCRIPTION ENCOUNTER (OUTPATIENT)
Age: 50
End: 2020-07-13

## 2020-07-13 VITALS
RESPIRATION RATE: 16 BRPM | HEART RATE: 90 BPM | TEMPERATURE: 98 F | OXYGEN SATURATION: 100 % | SYSTOLIC BLOOD PRESSURE: 113 MMHG | DIASTOLIC BLOOD PRESSURE: 88 MMHG

## 2020-07-13 LAB
ANION GAP SERPL CALC-SCNC: 6 MMOL/L — SIGNIFICANT CHANGE UP (ref 5–17)
BASOPHILS # BLD AUTO: 0.08 K/UL — SIGNIFICANT CHANGE UP (ref 0–0.2)
BASOPHILS NFR BLD AUTO: 1.1 % — SIGNIFICANT CHANGE UP (ref 0–2)
BUN SERPL-MCNC: 17 MG/DL — SIGNIFICANT CHANGE UP (ref 7–18)
CALCIUM SERPL-MCNC: 8.8 MG/DL — SIGNIFICANT CHANGE UP (ref 8.4–10.5)
CHLORIDE SERPL-SCNC: 105 MMOL/L — SIGNIFICANT CHANGE UP (ref 96–108)
CO2 SERPL-SCNC: 27 MMOL/L — SIGNIFICANT CHANGE UP (ref 22–31)
CREAT SERPL-MCNC: 1.04 MG/DL — SIGNIFICANT CHANGE UP (ref 0.5–1.3)
EOSINOPHIL # BLD AUTO: 0.17 K/UL — SIGNIFICANT CHANGE UP (ref 0–0.5)
EOSINOPHIL NFR BLD AUTO: 2.3 % — SIGNIFICANT CHANGE UP (ref 0–6)
GLUCOSE SERPL-MCNC: 100 MG/DL — HIGH (ref 70–99)
HCT VFR BLD CALC: 45.5 % — SIGNIFICANT CHANGE UP (ref 39–50)
HGB BLD-MCNC: 15.2 G/DL — SIGNIFICANT CHANGE UP (ref 13–17)
IMM GRANULOCYTES NFR BLD AUTO: 0.4 % — SIGNIFICANT CHANGE UP (ref 0–1.5)
LYMPHOCYTES # BLD AUTO: 2.21 K/UL — SIGNIFICANT CHANGE UP (ref 1–3.3)
LYMPHOCYTES # BLD AUTO: 30.3 % — SIGNIFICANT CHANGE UP (ref 13–44)
MAGNESIUM SERPL-MCNC: 2.1 MG/DL — SIGNIFICANT CHANGE UP (ref 1.6–2.6)
MCHC RBC-ENTMCNC: 27.8 PG — SIGNIFICANT CHANGE UP (ref 27–34)
MCHC RBC-ENTMCNC: 33.4 GM/DL — SIGNIFICANT CHANGE UP (ref 32–36)
MCV RBC AUTO: 83.3 FL — SIGNIFICANT CHANGE UP (ref 80–100)
MONOCYTES # BLD AUTO: 0.42 K/UL — SIGNIFICANT CHANGE UP (ref 0–0.9)
MONOCYTES NFR BLD AUTO: 5.8 % — SIGNIFICANT CHANGE UP (ref 2–14)
NEUTROPHILS # BLD AUTO: 4.38 K/UL — SIGNIFICANT CHANGE UP (ref 1.8–7.4)
NEUTROPHILS NFR BLD AUTO: 60.1 % — SIGNIFICANT CHANGE UP (ref 43–77)
NRBC # BLD: 0 /100 WBCS — SIGNIFICANT CHANGE UP (ref 0–0)
PHOSPHATE SERPL-MCNC: 3.5 MG/DL — SIGNIFICANT CHANGE UP (ref 2.5–4.5)
PLATELET # BLD AUTO: 231 K/UL — SIGNIFICANT CHANGE UP (ref 150–400)
POTASSIUM SERPL-MCNC: 4.1 MMOL/L — SIGNIFICANT CHANGE UP (ref 3.5–5.3)
POTASSIUM SERPL-SCNC: 4.1 MMOL/L — SIGNIFICANT CHANGE UP (ref 3.5–5.3)
RBC # BLD: 5.46 M/UL — SIGNIFICANT CHANGE UP (ref 4.2–5.8)
RBC # FLD: 13.1 % — SIGNIFICANT CHANGE UP (ref 10.3–14.5)
SODIUM SERPL-SCNC: 138 MMOL/L — SIGNIFICANT CHANGE UP (ref 135–145)
WBC # BLD: 7.29 K/UL — SIGNIFICANT CHANGE UP (ref 3.8–10.5)
WBC # FLD AUTO: 7.29 K/UL — SIGNIFICANT CHANGE UP (ref 3.8–10.5)

## 2020-07-13 PROCEDURE — 80053 COMPREHEN METABOLIC PANEL: CPT

## 2020-07-13 PROCEDURE — 82652 VIT D 1 25-DIHYDROXY: CPT

## 2020-07-13 PROCEDURE — 82553 CREATINE MB FRACTION: CPT

## 2020-07-13 PROCEDURE — 84484 ASSAY OF TROPONIN QUANT: CPT

## 2020-07-13 PROCEDURE — 93306 TTE W/DOPPLER COMPLETE: CPT

## 2020-07-13 PROCEDURE — 36415 COLL VENOUS BLD VENIPUNCTURE: CPT

## 2020-07-13 PROCEDURE — 86769 SARS-COV-2 COVID-19 ANTIBODY: CPT

## 2020-07-13 PROCEDURE — 78452 HT MUSCLE IMAGE SPECT MULT: CPT

## 2020-07-13 PROCEDURE — 85027 COMPLETE CBC AUTOMATED: CPT

## 2020-07-13 PROCEDURE — 80048 BASIC METABOLIC PNL TOTAL CA: CPT

## 2020-07-13 PROCEDURE — 85730 THROMBOPLASTIN TIME PARTIAL: CPT

## 2020-07-13 PROCEDURE — 82607 VITAMIN B-12: CPT

## 2020-07-13 PROCEDURE — 93005 ELECTROCARDIOGRAM TRACING: CPT

## 2020-07-13 PROCEDURE — 85610 PROTHROMBIN TIME: CPT

## 2020-07-13 PROCEDURE — A9502: CPT

## 2020-07-13 PROCEDURE — 84443 ASSAY THYROID STIM HORMONE: CPT

## 2020-07-13 PROCEDURE — 93017 CV STRESS TEST TRACING ONLY: CPT

## 2020-07-13 PROCEDURE — 82550 ASSAY OF CK (CPK): CPT

## 2020-07-13 PROCEDURE — 72125 CT NECK SPINE W/O DYE: CPT

## 2020-07-13 PROCEDURE — 82746 ASSAY OF FOLIC ACID SERUM: CPT

## 2020-07-13 PROCEDURE — 70450 CT HEAD/BRAIN W/O DYE: CPT

## 2020-07-13 PROCEDURE — 84100 ASSAY OF PHOSPHORUS: CPT

## 2020-07-13 PROCEDURE — 99285 EMERGENCY DEPT VISIT HI MDM: CPT

## 2020-07-13 PROCEDURE — 83735 ASSAY OF MAGNESIUM: CPT

## 2020-07-13 PROCEDURE — U0003: CPT

## 2020-07-13 PROCEDURE — 80061 LIPID PANEL: CPT

## 2020-07-13 PROCEDURE — 71275 CT ANGIOGRAPHY CHEST: CPT

## 2020-07-13 PROCEDURE — 71046 X-RAY EXAM CHEST 2 VIEWS: CPT

## 2020-07-13 PROCEDURE — 83036 HEMOGLOBIN GLYCOSYLATED A1C: CPT

## 2020-07-13 RX ORDER — LIDOCAINE 4 G/100G
1 CREAM TOPICAL
Qty: 7 | Refills: 0
Start: 2020-07-13 | End: 2020-07-19

## 2020-07-13 RX ORDER — CYCLOBENZAPRINE HYDROCHLORIDE 10 MG/1
1 TABLET, FILM COATED ORAL
Qty: 0 | Refills: 0 | DISCHARGE
Start: 2020-07-13

## 2020-07-13 RX ORDER — CYCLOBENZAPRINE HYDROCHLORIDE 10 MG/1
1 TABLET, FILM COATED ORAL
Qty: 21 | Refills: 0
Start: 2020-07-13 | End: 2020-07-19

## 2020-07-13 RX ORDER — LIDOCAINE 4 G/100G
0 CREAM TOPICAL
Qty: 0 | Refills: 0 | DISCHARGE
Start: 2020-07-13

## 2020-07-13 NOTE — PROGRESS NOTE ADULT - SUBJECTIVE AND OBJECTIVE BOX
Patient denies chest pain or shortness of breath.   Review of systems otherwise (-)  	  MEDICATIONS:  MEDICATIONS  (STANDING):  aspirin  chewable 81 milliGRAM(s) Oral daily  atorvastatin 40 milliGRAM(s) Oral at bedtime  enoxaparin Injectable 40 milliGRAM(s) SubCutaneous daily  lidocaine   Patch 1 Patch Transdermal daily  metoprolol tartrate 25 milliGRAM(s) Oral two times a day  senna 2 Tablet(s) Oral at bedtime      LABS:	 	    CARDIAC MARKERS:  CARDIAC MARKERS ( 11 Jul 2020 00:43 )  <0.015 ng/mL / x     / 241 U/L / x     / 1.2 ng/mL  CARDIAC MARKERS ( 10 Jul 2020 17:28 )  <0.015 ng/mL / x     / 258 U/L / x     / 1.9 ng/mL                                15.2   7.29  )-----------( 231      ( 13 Jul 2020 06:35 )             45.5     Hemoglobin: 15.2 g/dL (07-13 @ 06:35)  Hemoglobin: 14.9 g/dL (07-12 @ 06:59)  Hemoglobin: 14.1 g/dL (07-11 @ 07:13)  Hemoglobin: 14.3 g/dL (07-10 @ 11:25)      07-13    138  |  105  |  17  ----------------------------<  100<H>  4.1   |  27  |  1.04    Ca    8.8      13 Jul 2020 06:35  Phos  3.5     07-13  Mg     2.1     07-13      Creatinine Trend: 1.04<--, 1.04<--, 0.96<--, 0.96<--    COAGS:       proBNP:   Lipid Profile:   HgA1c:   TSH:       PHYSICAL EXAM:  T(C): 36.9 (07-13-20 @ 11:19), Max: 36.9 (07-13-20 @ 11:19)  HR: 90 (07-13-20 @ 11:19) (55 - 90)  BP: 113/88 (07-13-20 @ 11:19) (113/88 - 135/89)  RR: 16 (07-13-20 @ 11:19) (16 - 18)  SpO2: 100% (07-13-20 @ 11:19) (98% - 100%)  Wt(kg): --  I&O's Summary    12 Jul 2020 07:01  -  13 Jul 2020 07:00  --------------------------------------------------------  IN: 75 mL / OUT: 300 mL / NET: -225 mL          Gen: Appears well in NAD  HEENT:  (-)icterus (-)pallor  CV: N S1 S2 1/6 NAVARRO (+)2 Pulses B/l  Resp:  Clear to ausculatation B/L, normal effort  GI: (+) BS Soft, NT, ND  Lymph:  (-)Edema, (-)obvious lymphadenopathy  Skin: Warm to touch, Normal turgor  Psych: Appropriate mood and affect      TELEMETRY: 	  sinus        ASSESSMENT/PLAN: 	50y  Male significant PMHx and PSHx of R inguinal hernia repair presented to ED with L sided chest debbie r/o for MI, normal LV function    - Stress with no ischemia, infarct or reproduction of symptoms at 10 mets  - No need for further inpatient cardiac work up.  - Oupt cardiac f/u [phone]    Santos Corral MD, [facility]  BEEPER [phone]

## 2020-07-13 NOTE — DISCHARGE NOTE PROVIDER - NSDCCPCAREPLAN_GEN_ALL_CORE_FT
PRINCIPAL DISCHARGE DIAGNOSIS  Diagnosis: ACS (acute coronary syndrome)  Assessment and Plan of Treatment: PRINCIPAL DISCHARGE DIAGNOSIS  Diagnosis: Chest pain  Assessment and Plan of Treatment: You came in with chest pain which was relieved after you took nitrates. You were worked up for Acute Coronary Syndrome. You underwent cardiac echo and stress stess for work up which came back negative for causes of your chest pain.      SECONDARY DISCHARGE DIAGNOSES  Diagnosis: Numbness and tingling in left hand  Assessment and Plan of Treatment: Your chest pain came with back pain and left arm pain. Despite resolution and no recurrence of your chest pain, the back pain and left arm pain persisted. You underwent a Cervical Neck CT scan. You were given Flexeril, Lidocaine, and Percocet for the pain during your stay at the hospital. Please take your prescribed medications as directed. Please see Dr. Jasso within this week for further evaluation of your arm pain, numbness, and tingling.    Diagnosis: Prediabetes  Assessment and Plan of Treatment: During your stay in the hospital, you were tested for HbA1C which is a marker for diabetes. Your HbA1C level at 5.9% corresponds with prediabetes. Please follow up with your primary heathcare provider.    Diagnosis: Prophylactic measure  Assessment and Plan of Treatment: During your stay at the hospital, you were placed on prophylaxis for venous thromboembolism. This prophylaxis has been discontinued for discharge. PRINCIPAL DISCHARGE DIAGNOSIS  Diagnosis: Chest pain  Assessment and Plan of Treatment: You came in with chest pain which resolved during hospitalization. You were worked up for a heart attack. Cardiology, Dr. Corral was consulted for your evaluation. Echocardiogram, which is an ultrasound of the heart was normal. You then underwent a nuclear stress test that was negative for ischemia. You are recommended to follow up with Dr. Corral in 1-2 weeks. Information provided in discharge papers.      SECONDARY DISCHARGE DIAGNOSES  Diagnosis: Numbness and tingling in left hand  Assessment and Plan of Treatment: Your chest pain came with back pain and left arm pain and numbness. Despite resolution of your chest pain, the back pain and left arm pain persisted. You underwent a Cervical Neck CT scan which showed multilevel degeneration. You were given Flexeril, Lidocaine, and Percocet for the pain during your stay at the hospital. Please take your prescribed medications as directed. Please see Dr. Jasso within a week for further evaluation of your arm pain, numbness, and tingling.    Diagnosis: Prediabetes  Assessment and Plan of Treatment: During your stay in the hospital, you were tested for HbA1C which is a marker for diabetes. Your HbA1C level at 5.9% corresponds with prediabetes. Please follow up with your primary heathcare provider.  It is important for you to control your diet with foods low in sugars and carbohydrates. Avoid sweets such as cakes and candy. Avoid foods rich in carbohydrates such as bread and rice. Exercise daily for at least 20 minutes a day, every day.

## 2020-07-13 NOTE — PROGRESS NOTE ADULT - SUBJECTIVE AND OBJECTIVE BOX
PGY 1 Note discussed with supervising resident and primary attending    Patient is a 50y old  Male who presents with a chief complaint of Chest Pain (12 Jul 2020 14:55)      INTERVAL HPI/OVERNIGHT EVENTS: no events noted overnight. Pt with no further episodes of chest pain. Pt continues to c/o left hand numbness and left upper back pain. Pt for stress test, cervical and thoracic MRI today.     MEDICATIONS  (STANDING):  aspirin  chewable 81 milliGRAM(s) Oral daily  atorvastatin 40 milliGRAM(s) Oral at bedtime  enoxaparin Injectable 40 milliGRAM(s) SubCutaneous daily  lidocaine   Patch 1 Patch Transdermal daily  metoprolol tartrate 25 milliGRAM(s) Oral two times a day  senna 2 Tablet(s) Oral at bedtime    MEDICATIONS  (PRN):  acetaminophen   Tablet .. 650 milliGRAM(s) Oral every 6 hours PRN Moderate Pain (4 - 6)  cyclobenzaprine 5 milliGRAM(s) Oral three times a day PRN Muscle Spasm  oxycodone    5 mG/acetaminophen 325 mG 1 Tablet(s) Oral every 6 hours PRN Severe Pain (7 - 10)      __________________________________________________  REVIEW OF SYSTEMS:  CONSTITUTIONAL: No fever   EYES: no acute visual disturbances  NECK: No pain or stiffness  RESPIRATORY: No cough; No shortness of breath  CARDIOVASCULAR: No chest pain, no palpitations  GASTROINTESTINAL: No pain. No nausea or vomiting; No diarrhea   NEUROLOGICAL: No headache or numbness, no tremors  MUSCULOSKELETAL: No joint pain, no muscle pain  GENITOURINARY: no dysuria, no frequency, no hesitancy  PSYCHIATRY: no depression , no anxiety  ALL OTHER ROS negative        Vital Signs Last 24 Hrs  T(C): 36.8 (13 Jul 2020 07:56), Max: 37 (12 Jul 2020 11:20)  T(F): 98.2 (13 Jul 2020 07:56), Max: 98.6 (12 Jul 2020 11:20)  HR: 55 (13 Jul 2020 07:56) (55 - 70)  BP: 133/87 (13 Jul 2020 07:56) (114/70 - 135/89)  BP(mean): --  RR: 16 (13 Jul 2020 07:56) (16 - 18)  SpO2: 100% (13 Jul 2020 07:56) (98% - 100%)    ________________________________________________  PHYSICAL EXAM:  GENERAL: NAD  HEENT: Normocephalic;  conjunctivae and sclerae clear; moist mucous membranes;   NECK : supple  CHEST/LUNG: Clear to auscultation bilaterally with good air entry   HEART: S1 S2  regular; no murmurs, gallops or rubs  ABDOMEN: Soft, Nontender, Nondistended; Bowel sounds present  EXTREMITIES: no cyanosis; no edema; no calf tenderness  SKIN: warm and dry; no rash  NERVOUS SYSTEM:  Awake and alert; Oriented  to place, person and time ; no new deficits. (+) Sensation on b/l UE intact. Proprioception intact    _________________________________________________  LABS:                        15.2   7.29  )-----------( 231      ( 13 Jul 2020 06:35 )             45.5     07-13    138  |  105  |  17  ----------------------------<  100<H>  4.1   |  27  |  1.04    Ca    8.8      13 Jul 2020 06:35  Phos  3.5     07-13  Mg     2.1     07-13          CAPILLARY BLOOD GLUCOSE            RADIOLOGY & ADDITIONAL TESTS:    < from: CT Cervical Spine No Cont (07.10.20 @ 13:57) >  IMPRESSION:  No acute intracranial hemorrhage or acute territorial infarct. If pain persists, follow-up MRI exam recommended    No acute fracture cervical spine. Multilevel degenerative changes as described above. Degenerative changes better evaluated on MRI exam.    < end of copied text >      Imaging Personally Reviewed:  YES    Consultant(s) Notes Reviewed:   YES    Care Discussed with Consultants : YES     Plan of care was discussed with patient and /or primary care giver; all questions and concerns were addressed and care was aligned with patient's wishes.

## 2020-07-13 NOTE — DISCHARGE NOTE PROVIDER - PROVIDER TOKENS
PROVIDER:[TOKEN:[[id number]],FOLLOWUP:[2 weeks]],PROVIDER:[TOKEN:[[id number]],FOLLOWUP:[1 week]] PROVIDER:[TOKEN:[[id number]],FOLLOWUP:[2 weeks]],PROVIDER:[TOKEN:[[id number]],FOLLOWUP:[1-3 days]] PROVIDER:[TOKEN:[[id number]],FOLLOWUP:[2 weeks]],PROVIDER:[TOKEN:[[id number]],SCHEDULEDAPPT:[07/21/2020],SCHEDULEDAPPTTIME:[09:40 AM]]

## 2020-07-13 NOTE — DISCHARGE NOTE PROVIDER - HOSPITAL COURSE
50 y M with no significant PMHx and PSHx of R inguinal hernia repair presented to ED with L sided chest pain since yesterday am. Pt states he was resting when he started having L sided CP, constant , sharp , with radiation to back, currently 8/10 . Pt also complained of L arm numbness. Pt went to ER in [place] yesterday and was given IV pain medication and was discharged without any tests. Pt states his pain didn't go away so he came to the ED . Patient denied any SOB,  palpitations, nausea , sweating,  fever, cough, head ache , abd pain, n/v/d, constipation.        Chest Pain    Patient was admitted for workup of ACS. Trops negative x3, echocardiogram obtained showed normal EF, trace mitral regurg. Cardio was Dr. Corral. Patient's patient improved during hospitalization. Stress test was negative for reversible ischemia. Patient to follow up in 1-2 weeks        Left hand numbness    Patient had complains of numbness, CTAngio head was done that did not reveal stroke or narrowing of vessels. CT angio c-spine, however did revealed multi level degenerative changes that may explain patuient's numbness and pain. Neurology, Dr. Jasso was consulted regarding MR and recommended to follow up outpatient for MRI        Patient is stable for discharge per attending and is advised to follow up with PCP as outpatient    Please refer to patient's complete medical chart with documents for a full hospital course, for this is only a brief summary.

## 2020-07-13 NOTE — PROGRESS NOTE ADULT - PROBLEM SELECTOR PLAN 3
IMPROVE VTE  RISK                                                                Points  [  ] Previous VTE                                                  3  [  ] Thrombophilia                                               2  [  ] Lower limb paralysis                                      2        (unable to hold up >15 seconds)    [  ] Current Cancer                                              2         (within 6 months)  [ x ] Immobilization > 24 hrs                                1  [  ] ICU/CCU stay > 24 hours                              1  [  ] Age > 60                                                      1  IMPROVE VTE Score 1  -on lovenox

## 2020-07-13 NOTE — PROGRESS NOTE ADULT - PROBLEM SELECTOR PLAN 2
-CTA - no PE  -On Flexeril  -CT neck - multilevel degenerative changes   - For MRI Cervical and Thoracic spine

## 2020-07-13 NOTE — DISCHARGE NOTE PROVIDER - CARE PROVIDER_API CALL
Santos Corral  CARDIOVASCULAR DISEASE  [address]  [place] [address]  Phone: [phone]  Fax: [phone]  Follow Up Time: 2 weeks    Lukasz Jasso)  Clinical Neurophysiology; Neurology  [address] [facility], [address] [place] [address]  Phone: [phone]  Fax: [phone]  Follow Up Time: 1 week Santos Corral  CARDIOVASCULAR DISEASE  [address]  [place] [address]  Phone: [phone]  Fax: [phone]  Follow Up Time: 2 weeks    Lukasz Jasso)  Clinical Neurophysiology; Neurology  [address] [place] [address], [address] [place] [address]  Phone: [phone]  Fax: [phone]  Follow Up Time: 1-3 days Santos Corral  CARDIOVASCULAR DISEASE  [address] [facility] [address]  [place] [address]  Phone: [phone]  Fax: [phone]  Follow Up Time: 2 weeks    Lukasz Jasso)  Clinical Neurophysiology; Neurology  [address] [place] [address], [address] [place] [address]  Phone: [phone]  Fax: [phone]  Scheduled Appointment: 07/21/2020 09:40 AM

## 2020-07-13 NOTE — PROGRESS NOTE ADULT - ATTENDING COMMENTS
Agree with above assessment and plan as outlined above.    - plan for stress monday    Santos Corral MD, [facility]  BEEPER [phone]
Seen and examined . Done with stress test . I am still having pain upper back and left arm numbness . Neurologist Dr Jasso did not approve his MRI . D/W Dr Jasso and said patient has Numbness with foraminal narrowing so MRI will not  as not surgical candidate . I will see patient in office and order if needed. Explained the same to patient who is okay to go home on pain meds and muscle relaxants to follow up outpt.

## 2020-07-13 NOTE — DISCHARGE NOTE NURSING/CASE MANAGEMENT/SOCIAL WORK - PATIENT PORTAL LINK FT
You can access the FollowMyHealth Patient Portal offered by [facility] by registering at the following website: [email or web address]. By joining [employer]’s FollowMyHealth portal, you will also be able to view your health information using other applications (apps) compatible with our system.

## 2020-07-13 NOTE — DISCHARGE NOTE PROVIDER - NSDCMRMEDTOKEN_GEN_ALL_CORE_FT
cyclobenzaprine 5 mg oral tablet: 1 tab(s) orally 3 times a day, As needed, Muscle Spasm  lidocaine 5% topical film: Apply topically to affected area once a day  naproxen 500 mg oral tablet: 1 tab(s) orally 2 times a day -for moderate pain   oxycodone-acetaminophen 5 mg-325 mg oral tablet: 1 tab(s) orally every 6 hours, As needed, Severe Pain (7 - 10) naproxen 500 mg oral tablet: 1 tab(s) orally 2 times a day -for moderate pain cyclobenzaprine 5 mg oral tablet: 1 tab(s) orally 3 times a day, As needed, Muscle Spasm MDD:3  lidocaine 5% topical film: Apply topically to affected area once a day   naproxen 500 mg oral tablet: 1 tab(s) orally 2 times a day -for moderate pain   oxycodone-acetaminophen 5 mg-325 mg oral tablet: 1 tab(s) orally every 6 hours, As needed, Severe Pain (7 - 10) MDD:4

## 2020-07-13 NOTE — PROGRESS NOTE ADULT - PROBLEM SELECTOR PLAN 1
relieved with nitroglycerin.  Troponin neg 3x  EKG -ve for ischemic changes, RBBB.  will monitor the patient on telemetry  -For Stress Test  -Increased triglycerides  -HbA1c: 5.9%  -No recurrence of chest pain since admission

## 2020-07-13 NOTE — PROGRESS NOTE ADULT - ASSESSMENT
50 y M with no significant PMHx and PSHx of R inguinal hernia repair presented to ED with L sided chest pain since yesterday am. Pt states he was resting when he started having L sided CP, constant , sharp , with radiation to back, currently 8/10. Pt found to have multilevel degenerative changes on Neck CT. Pt for stress test for chest pain work up, cervical and thoracic MRI for work up of L hand numbness.

## 2020-07-15 PROBLEM — Z00.00 ENCOUNTER FOR PREVENTIVE HEALTH EXAMINATION: Status: ACTIVE | Noted: 2020-07-15

## 2020-07-16 ENCOUNTER — EMERGENCY (EMERGENCY)
Facility: HOSPITAL | Age: 50
LOS: 1 days | Discharge: HOME | End: 2020-07-16
Attending: STUDENT IN AN ORGANIZED HEALTH CARE EDUCATION/TRAINING PROGRAM | Admitting: EMERGENCY MEDICINE
Payer: COMMERCIAL

## 2020-07-16 VITALS
DIASTOLIC BLOOD PRESSURE: 106 MMHG | HEART RATE: 95 BPM | SYSTOLIC BLOOD PRESSURE: 146 MMHG | HEIGHT: 66 IN | TEMPERATURE: 98 F | RESPIRATION RATE: 18 BRPM | OXYGEN SATURATION: 100 %

## 2020-07-16 VITALS
OXYGEN SATURATION: 100 % | RESPIRATION RATE: 15 BRPM | SYSTOLIC BLOOD PRESSURE: 163 MMHG | HEART RATE: 67 BPM | DIASTOLIC BLOOD PRESSURE: 107 MMHG

## 2020-07-16 DIAGNOSIS — Z98.890 OTHER SPECIFIED POSTPROCEDURAL STATES: Chronic | ICD-10-CM

## 2020-07-16 PROCEDURE — 93010 ELECTROCARDIOGRAM REPORT: CPT

## 2020-07-16 PROCEDURE — 99283 EMERGENCY DEPT VISIT LOW MDM: CPT | Mod: 25

## 2020-07-16 RX ORDER — OXYCODONE HYDROCHLORIDE 5 MG/1
5 TABLET ORAL ONCE
Refills: 0 | Status: DISCONTINUED | OUTPATIENT
Start: 2020-07-16 | End: 2020-07-16

## 2020-07-16 RX ORDER — KETOROLAC TROMETHAMINE 30 MG/ML
30 SYRINGE (ML) INJECTION ONCE
Refills: 0 | Status: DISCONTINUED | OUTPATIENT
Start: 2020-07-16 | End: 2020-07-16

## 2020-07-16 RX ORDER — ACETAMINOPHEN 500 MG
975 TABLET ORAL ONCE
Refills: 0 | Status: COMPLETED | OUTPATIENT
Start: 2020-07-16 | End: 2020-07-16

## 2020-07-16 RX ADMIN — Medication 30 MILLIGRAM(S): at 08:45

## 2020-07-16 RX ADMIN — OXYCODONE HYDROCHLORIDE 5 MILLIGRAM(S): 5 TABLET ORAL at 08:44

## 2020-07-16 RX ADMIN — Medication 975 MILLIGRAM(S): at 08:44

## 2020-07-16 NOTE — ED ADULT NURSE NOTE - OBJECTIVE STATEMENT
Pt received to intake room 10C. Pt comes to ED c/o intermittent mid upper back pain radiating to left shoulder/arm since Thursday. Pt seen at another ED for same symptoms, was d/c and told to follow up with a neurologist. Endorses he has an appointment on Tuesday but he "couldn't stand the pain." Denies recent falls/trauma to area. No redness, swelling or bruising noted. Pt has full ROM in left shoulder and left arm. Denies having any past medical hx despite current elevated blood pressure. Respirations are even & unlabored, denies chest pain, dyspnea, N/V/D, chills, fever, weakness, dizziness, headache, palpitations, cough, blurry vision/changes in vision. Pt is A&Ox4, ambulates independently. Medication given as per MD orders. No IV Access at this time.
No

## 2020-07-16 NOTE — ED ADULT NURSE NOTE - CHIEF COMPLAINT QUOTE
Pt. c/o upper back pain that began last Thursday. Reports he was recently discharged from [facility] and told to follow up with neurology, but has not had appointment yet. Denies chest pain at present. Denies heavy lifting, falls.

## 2020-07-16 NOTE — ED PROVIDER NOTE - PHYSICAL EXAMINATION
Gen: Alert and oriented. Lying comfortably in bed. Answering questions appropriately  HEENT: extra occular movements intact, no nasal discharge, mucous membranes moist  CV: Regular rate and rhythm, +S1/S2, no murmurs/rubs/gallops, radial pulses intact bl.   Resp: Clear to ausculation bilaterally, no wheezes/rhonchi/rales  GI: Abdomen soft non-distended, non tender to palpation, no masses  MSK: Left shoulder and back non tender to palpation. FROM at left shoulder. No LE edema bl  Neuro: A&Ox4, following commands, moving all four extremities spontaneously. Decreased sensation at left pinky finger and index finger. Strength intact at left hand and fingers.   Psych: appropriate mood

## 2020-07-16 NOTE — ED PROVIDER NOTE - CLINICAL SUMMARY MEDICAL DECISION MAKING FREE TEXT BOX
Joseph Frankel PGY2: 49 yo with left back/shoulder pain. VSS. Patient looks well and is non toxic appearing. PE as above. Most likely neuropathic pain given shooting nature and numbness at fingers with profession potentially predisposing patient to pinched nerve. Unlikely cardiac or vascular as patient with recent work up at  which was negative and patients pulses intact. As patient has good neuro follow up will control pain and  patient on pain control. Will reassess.

## 2020-07-16 NOTE — ED PROVIDER NOTE - PATIENT PORTAL LINK FT
You can access the FollowMyHealth Patient Portal offered by [facility] by registering at the following website: [email or web address]. By joining [facility]’s FollowMyHealth portal, you will also be able to view your health information using other applications (apps) compatible with our system.

## 2020-07-16 NOTE — ED ADULT TRIAGE NOTE - CHIEF COMPLAINT QUOTE
Pt. c/o upper back pain that began last Thursday. Reports he was recently discharged from [facility] and told to follow up with neurology, but has not had appointment yet. Denies chest pain at present. Denies heavy lifting, falls.

## 2020-07-16 NOTE — ED PROVIDER NOTE - OBJECTIVE STATEMENT
51 yo M with no pmh presenting for left back and shoulder pain that radiates down arm and to the ring and pinky finger on the left hand. Shooting in nature. Moderate to severe in severity. Associated with some numbness at left index and pinky fingers. Denies cp, SOB, weakness. Was discharged a few days ago from [facility] where he was worked up for chest pain with negative stress, echo, CTA chest. Patient was discharged with cyclobenzaprine and oxycodone but has not taken the oxycodone. Presents today because of the pain. Has outpatient neuro follow up scheduled for Tuesday. 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

## 2020-07-16 NOTE — ED PROVIDER NOTE - PROGRESS NOTE DETAILS
Derek Escobar MD: pain markedly improved s/p meds - patient has outpatient appointment scheduled on saturday, will f/u w/ his neurologist as well

## 2020-07-16 NOTE — ED PROVIDER NOTE - NSFOLLOWUPINSTRUCTIONS_ED_ALL_ED_FT
You were seen for back/shoulder pain. It is most likely caused by a pinched nerve in your neck.     Please follow up with neurology on Tuesday.    You may use tylenol for pain. Please do not exceed 3250 mg in 24 hours. You may use ibuprofen for pain. Please do not exceed 3000 mg in 24 hours. For breakthrough pain, use the oxycodone that was prescribed to you. Please read medication warnings.    Return to ED for shortness of breath, chest pain, weakness in arm or for any other concerns.

## 2020-07-16 NOTE — ED PROVIDER NOTE - ATTENDING CONTRIBUTION TO CARE
Shawn JANE: I agree with the above provided history and exam and addend/modify it as follows. 50M w/ left back and shoulder pain that radiates down arm and to the ring and pinky finger on the left hand. Had recent admission for chest pain and had thorough cardiac / PE workup performed. discharged w/ pain meds but didn't take today. No fever, n/v/d/c, chest / abd pain, cough, sob, dizziness, dysuria/hematuria. Exam reassuring - no tenderness, no erythema / swelling, lungs clear bilaterally. Plan for symptomatic pain relief and reassessment, likely discharge w/ close outpt f/u    I Derek Escobar MD performed a history and physical exam of the patient and discussed their management with the resident and /or advanced care provider. I reviewed the resident and /or ACP's note and agree with the documented findings and plan of care. My medical decision making and observations are found above.

## 2020-07-16 NOTE — ED PROVIDER NOTE - NS ED ROS FT
Gen: Denies fever, weight loss  CV: Denies chest pain, palpitations  HEENT: Denies neck pain, sore throat, eye discharge  Skin: Denies rash, erythema, color changes  Resp: Denies SOB, cough  Endo: Denies sensitivity to heat, cold, increased urination  GI: Denies constipation, nausea, vomiting  Msk: Denies LE swelling  : Denies dysuria, increased frequency  Neuro: Denies LOC, weakness, seizures  Psych: Denies hx of psych, hallucinations, SI

## 2020-07-21 ENCOUNTER — APPOINTMENT (OUTPATIENT)
Dept: NEUROLOGY | Facility: CLINIC | Age: 50
End: 2020-07-21
Payer: COMMERCIAL

## 2020-07-21 VITALS — SYSTOLIC BLOOD PRESSURE: 141 MMHG | DIASTOLIC BLOOD PRESSURE: 102 MMHG | HEART RATE: 111 BPM

## 2020-07-21 VITALS
DIASTOLIC BLOOD PRESSURE: 103 MMHG | SYSTOLIC BLOOD PRESSURE: 148 MMHG | HEIGHT: 66 IN | HEART RATE: 124 BPM | OXYGEN SATURATION: 98 % | WEIGHT: 168 LBS | TEMPERATURE: 97.6 F | BODY MASS INDEX: 27 KG/M2

## 2020-07-21 DIAGNOSIS — M47.12 OTHER SPONDYLOSIS WITH MYELOPATHY, CERVICAL REGION: ICD-10-CM

## 2020-07-21 PROCEDURE — 99203 OFFICE O/P NEW LOW 30 MIN: CPT

## 2020-07-21 RX ORDER — NORTRIPTYLINE HYDROCHLORIDE 10 MG/1
10 CAPSULE ORAL
Qty: 60 | Refills: 3 | Status: ACTIVE | COMMUNITY
Start: 2020-07-21 | End: 1900-01-01

## 2020-07-21 RX ORDER — MELOXICAM 15 MG/1
15 TABLET ORAL
Qty: 30 | Refills: 0 | Status: ACTIVE | COMMUNITY
Start: 2020-07-21 | End: 1900-01-01

## 2020-07-21 NOTE — PHYSICAL EXAM
[Sensation Vibration Decrease] : vibration was intact [Proprioception] : proprioception was intact [Tactile Decr 4-5th Digits, Ulnar Hand, Distal Forearm L Only] : diminished left 4-5th digits and distal forearm (C8) [Pain / Temp Decr 4-5th Dig, Ulnar Hand, Dist Forearm L. Only] : diminished left 4-5th digits and distal forearm (C8) [Abnormal Walk] : normal gait [Plantar Reflex Left Only] : abnormal on the left [2+] : Ankle jerk left 2+ [Limited Balance] : balance was intact [Tremor] : no tremor present [Past-pointing] : there was no past-pointing [Coordination - Dysmetria Impaired Finger-to-Nose Bilateral] : not present [Dysdiadochokinesia Bilaterally] : not present [Plantar Reflex Right Only] : normal on the right [Coordination - Dysmetria Impaired Heel-to-Shin Bilateral] : not present [___] : absent on the left [___] : absent on the right

## 2020-07-21 NOTE — HISTORY OF PRESENT ILLNESS
[FreeTextEntry1] : Pain in the left neck radiating into the left shoulder and arm subacute onset with no precipitating trauma or illness 10 days ago. Severity took him to the ER and he was admitted and treated with mild relief of pain. CT spine in the hospital demonstrated significant cervical spine disc disease with central canal stenosis and foraminal narrowing, worst at C5/6 & C6/7

## 2020-07-21 NOTE — DISCUSSION/SUMMARY
[FreeTextEntry1] : Reviewed CT cervical spine: C6/7 left> right radiculopathy; left Babinski consistent with myelopathy. Plan meloxicam nortriptyline PT NCV MRI C-spine follow. Advise leave from work x 1 month

## 2020-07-30 PROBLEM — Z78.9 OTHER SPECIFIED HEALTH STATUS: Chronic | Status: ACTIVE | Noted: 2020-07-10

## 2020-07-31 ENCOUNTER — OUTPATIENT (OUTPATIENT)
Dept: OUTPATIENT SERVICES | Facility: HOSPITAL | Age: 50
LOS: 1 days | End: 2020-07-31
Payer: COMMERCIAL

## 2020-07-31 ENCOUNTER — APPOINTMENT (OUTPATIENT)
Dept: MRI IMAGING | Facility: HOSPITAL | Age: 50
End: 2020-07-31
Payer: COMMERCIAL

## 2020-07-31 DIAGNOSIS — M47.12 OTHER SPONDYLOSIS WITH MYELOPATHY, CERVICAL REGION: ICD-10-CM

## 2020-07-31 DIAGNOSIS — Z98.890 OTHER SPECIFIED POSTPROCEDURAL STATES: Chronic | ICD-10-CM

## 2020-07-31 PROCEDURE — 72141 MRI NECK SPINE W/O DYE: CPT | Mod: 26

## 2020-07-31 PROCEDURE — 72141 MRI NECK SPINE W/O DYE: CPT

## 2020-09-08 ENCOUNTER — APPOINTMENT (OUTPATIENT)
Dept: NEUROLOGY | Facility: CLINIC | Age: 50
End: 2020-09-08
Payer: COMMERCIAL

## 2020-09-08 VITALS
TEMPERATURE: 98.5 F | WEIGHT: 161 LBS | HEART RATE: 85 BPM | HEIGHT: 66 IN | BODY MASS INDEX: 25.88 KG/M2 | SYSTOLIC BLOOD PRESSURE: 143 MMHG | OXYGEN SATURATION: 98 % | DIASTOLIC BLOOD PRESSURE: 90 MMHG

## 2020-09-08 PROCEDURE — 99214 OFFICE O/P EST MOD 30 MIN: CPT | Mod: 25

## 2020-09-08 PROCEDURE — 95886 MUSC TEST DONE W/N TEST COMP: CPT

## 2020-09-08 PROCEDURE — 95908 NRV CNDJ TST 3-4 STUDIES: CPT

## 2020-09-22 NOTE — PHYSICAL EXAM
[Sensation Vibration Decrease] : vibration was intact [Proprioception] : proprioception was intact [Tactile Decr 4-5th Digits, Ulnar Hand, Distal Forearm L Only] : diminished left 4-5th digits and distal forearm (C8) [Pain / Temp Decr 4-5th Dig, Ulnar Hand, Dist Forearm L. Only] : diminished left 4-5th digits and distal forearm (C8) [Abnormal Walk] : normal gait [2+] : Ankle jerk left 2+ [Plantar Reflex Left Only] : abnormal on the left [Limited Balance] : balance was intact [Past-pointing] : there was no past-pointing [Tremor] : no tremor present [Dysdiadochokinesia Bilaterally] : not present [Coordination - Dysmetria Impaired Finger-to-Nose Bilateral] : not present [Coordination - Dysmetria Impaired Heel-to-Shin Bilateral] : not present [Plantar Reflex Right Only] : normal on the right [___] : absent on the right [___] : absent on the left

## 2020-09-22 NOTE — DISCUSSION/SUMMARY
[FreeTextEntry1] : Reviewed CT cervical spine: C6/7 left> right radiculopathy; left Babinski consistent with myelopathy. Plan meloxicam nortriptyline PT NCV MRI C-spine follow. Advise leave from work x 1 month\par Reviewed and discussed images of  MRI C-spine; - no john cord compression but moderately severe bilateral neural foraminal narrowing at C5-6 and mild to moderate narrowing at C3/4, C4/5. C6/7 levels. \par Recommend continue PT. Spine surgery not indicated. Follow up after 3 months

## 2020-09-22 NOTE — HISTORY OF PRESENT ILLNESS
[FreeTextEntry1] : Pain in the left neck radiating into the left shoulder and arm subacute onset with no precipitating trauma or illness 10 days ago. Severity took him to the ER and he was admitted and treated with mild relief of pain. CT spine in the hospital demonstrated significant cervical spine disc disease with central canal stenosis and foraminal narrowing, worst at C5/6 & C6/7\par Has been receiving PT with some relief

## 2022-07-03 NOTE — ED ADULT TRIAGE NOTE - WEIGHT IN LBS
Diagnosis: B ALL Ph(-)    [facility]    Day: 10    Patient Endorses:     Review of Systems: denies chest pain, sob, headaache    Pain scale: denies    Diet: regular/[facility]    Allergies:  No Known Allergies      ANTIMICROBIALS  atovaquone  Suspension 1500 milliGRAM(s) Oral daily  caspofungin IVPB 50 milliGRAM(s) IV Intermittent every 24 hours  cefepime   IVPB 2000 milliGRAM(s) IV Intermittent every 8 hours      HEME/ONC MEDICATIONS  methotrexate PF IntraThecal (eMAR) 15 milliGRAM(s) IntraThecal once      STANDING MEDICATIONS  allopurinol 300 milliGRAM(s) Oral daily  benzonatate 100 milliGRAM(s) Oral three times a day  Biotene Dry Mouth Oral Rinse 15 milliLiter(s) Swish and Spit five times a day  chlorhexidine 2% Cloths 1 Application(s) Topical daily  dextrose 5%. 1000 milliLiter(s) IV Continuous <Continuous>  dextrose 5%. 1000 milliLiter(s) IV Continuous <Continuous>  dextrose 50% Injectable 25 Gram(s) IV Push once  dextrose 50% Injectable 12.5 Gram(s) IV Push once  dextrose 50% Injectable 25 Gram(s) IV Push once  filgrastim-sndz (ZARXIO) Injectable 480 MICROGram(s) SubCutaneous daily  furosemide   Injectable 40 milliGRAM(s) IV Push daily  glucagon  Injectable 1 milliGRAM(s) IntraMuscular once  glucagon  Injectable 1 milliGRAM(s) IntraMuscular once  influenza   Vaccine 0.5 milliLiter(s) IntraMuscular once  insulin glargine Injectable (LANTUS) 55 Unit(s) SubCutaneous at bedtime  insulin lispro (ADMELOG) corrective regimen sliding scale   SubCutaneous <User Schedule>  insulin lispro (ADMELOG) corrective regimen sliding scale   SubCutaneous three times a day before meals  insulin lispro Injectable (ADMELOG) 36 Unit(s) SubCutaneous before breakfast  insulin lispro Injectable (ADMELOG) 32 Unit(s) SubCutaneous before lunch  insulin lispro Injectable (ADMELOG) 28 Unit(s) SubCutaneous before dinner  levothyroxine 50 MICROGram(s) Oral daily  pantoprazole    Tablet 40 milliGRAM(s) Oral daily  polyethylene glycol 3350 17 Gram(s) Oral two times a day  predniSONE   Tablet 116 milliGRAM(s) Oral every 24 hours  sodium chloride 0.65% Nasal 1 Spray(s) Both Nostrils three times a day      PRN MEDICATIONS  acetaminophen     Tablet .. 650 milliGRAM(s) Oral every 6 hours PRN  aluminum hydroxide/magnesium hydroxide/simethicone Suspension 30 milliLiter(s) Oral once PRN  dextrose Oral Gel 15 Gram(s) Oral once PRN  diphenhydrAMINE 25 milliGRAM(s) Oral every 4 hours PRN  senna 2 Tablet(s) Oral two times a day PRN  sodium chloride 0.9% lock flush 10 milliLiter(s) IV Push every 1 hour PRN        Vital Signs Last 24 Hrs  T(C): 37.3 (03 Jul 2022 04:23), Max: 37.3 (03 Jul 2022 04:23)  T(F): 99.1 (03 Jul 2022 04:23), Max: 99.1 (03 Jul 2022 04:23)  HR: 85 (03 Jul 2022 04:23) (79 - 101)  BP: 116/75 (03 Jul 2022 04:23) (95/61 - 138/76)  RR: 20 (03 Jul 2022 04:23) (16 - 20)  SpO2: 98% (03 Jul 2022 04:23) (97% - 99%)    PHYSICAL EXAM  General: adult in NAD  HEENT: clear oropharynx, anicteric sclera, pink conjunctiva  Neck: supple  CV: normal S1/S2 RRR  Lungs: positive air movement b/l ant lungs,clear to auscultation, no wheezes, no rales  Abdomen: soft non-tender non-distended, no hepatosplenomegaly  Ext: no clubbing cyanosis or edema  Skin: no rashes and no petechiae  Neuro: alert and oriented X 4, no focal deficits  Central Line: normal    LABS:        RADIOLOGY & ADDITIONAL STUDIES:         167.9 Diagnosis: B ALL Ph(-)    [facility]    [facility]: 10    Patient Endorses:     Review of Systems: denies chest pain, sob, headaache    Pain scale: denies    Diet: regular/[facility]    Allergies:  No Known Allergies      ANTIMICROBIALS  atovaquone  Suspension 1500 milliGRAM(s) Oral daily  caspofungin IVPB 50 milliGRAM(s) IV Intermittent every 24 hours  cefepime   IVPB 2000 milliGRAM(s) IV Intermittent every 8 hours      HEME/ONC MEDICATIONS  methotrexate PF IntraThecal (eMAR) 15 milliGRAM(s) IntraThecal once      STANDING MEDICATIONS  allopurinol 300 milliGRAM(s) Oral daily  benzonatate 100 milliGRAM(s) Oral three times a day  Biotene Dry Mouth Oral Rinse 15 milliLiter(s) Swish and Spit five times a day  chlorhexidine 2% Cloths 1 Application(s) Topical daily  dextrose 5%. 1000 milliLiter(s) IV Continuous <Continuous>  dextrose 5%. 1000 milliLiter(s) IV Continuous <Continuous>  dextrose 50% Injectable 25 Gram(s) IV Push once  dextrose 50% Injectable 12.5 Gram(s) IV Push once  dextrose 50% Injectable 25 Gram(s) IV Push once  filgrastim-sndz (ZARXIO) Injectable 480 MICROGram(s) SubCutaneous daily  furosemide   Injectable 40 milliGRAM(s) IV Push daily  glucagon  Injectable 1 milliGRAM(s) IntraMuscular once  glucagon  Injectable 1 milliGRAM(s) IntraMuscular once  influenza   Vaccine 0.5 milliLiter(s) IntraMuscular once  insulin glargine Injectable (LANTUS) 55 Unit(s) SubCutaneous at bedtime  insulin lispro (ADMELOG) corrective regimen sliding scale   SubCutaneous <User Schedule>  insulin lispro (ADMELOG) corrective regimen sliding scale   SubCutaneous three times a day before meals  insulin lispro Injectable (ADMELOG) 36 Unit(s) SubCutaneous before breakfast  insulin lispro Injectable (ADMELOG) 32 Unit(s) SubCutaneous before lunch  insulin lispro Injectable (ADMELOG) 28 Unit(s) SubCutaneous before dinner  levothyroxine 50 MICROGram(s) Oral daily  pantoprazole    Tablet 40 milliGRAM(s) Oral daily  polyethylene glycol 3350 17 Gram(s) Oral two times a day  predniSONE   Tablet 116 milliGRAM(s) Oral every 24 hours  sodium chloride 0.65% Nasal 1 Spray(s) Both Nostrils three times a day      PRN MEDICATIONS  acetaminophen     Tablet .. 650 milliGRAM(s) Oral every 6 hours PRN  aluminum hydroxide/magnesium hydroxide/simethicone Suspension 30 milliLiter(s) Oral once PRN  dextrose Oral Gel 15 Gram(s) Oral once PRN  diphenhydrAMINE 25 milliGRAM(s) Oral every 4 hours PRN  senna 2 Tablet(s) Oral two times a day PRN  sodium chloride 0.9% lock flush 10 milliLiter(s) IV Push every 1 hour PRN        Vital Signs Last 24 Hrs  T(C): 37.3 (03 Jul 2022 04:23), Max: 37.3 (03 Jul 2022 04:23)  T(F): 99.1 (03 Jul 2022 04:23), Max: 99.1 (03 Jul 2022 04:23)  HR: 85 (03 Jul 2022 04:23) (79 - 101)  BP: 116/75 (03 Jul 2022 04:23) (95/61 - 138/76)  RR: 20 (03 Jul 2022 04:23) (16 - 20)  SpO2: 98% (03 Jul 2022 04:23) (97% - 99%)    PHYSICAL EXAM  General: NAD, sitting in chair   HEENT:  anicteric sclera, no oral lesions  Neck: supple  CV: normal S1/S2 RRR  Lungs: CTA b/l, diminished at bases  Abdomen: soft non-tender non-distended, +BS  Ext: no BLE edema  Skin: no rashes   Neuro: alert and oriented X 3, no focal deficits  Central Line: C/D/I    Cultures:  Culture - Urine (06.28.22 @ 11:05)    Specimen Source: Clean Catch Clean Catch (Midstream)    Culture Results:   <10,000 CFU/mL Normal Urogenital Cecille    Culture - Blood (06.17.22 @ 16:33)    Specimen Source: .Blood Blood-Peripheral    Culture Results:   No Growth Final    Culture - Blood (06.17.22 @ 16:30)    Specimen Source: .Blood Blood-Peripheral    Culture Results:   No Growth Final    LABS:        RADIOLOGY & ADDITIONAL STUDIES:         Diagnosis: B ALL Ph(-)    Protocol/Chemo Regimen: Following [facility]    Day: 10    Patient Endorses: intermittent nausea, gas pains    Review of Systems: denies chest pain, sob, headaache    Pain scale: denies    Diet: regular/[facility]    Allergies:  No Known Allergies      ANTIMICROBIALS  atovaquone  Suspension 1500 milliGRAM(s) Oral daily  caspofungin IVPB 50 milliGRAM(s) IV Intermittent every 24 hours  cefepime   IVPB 2000 milliGRAM(s) IV Intermittent every 8 hours      HEME/ONC MEDICATIONS  methotrexate PF IntraThecal (eMAR) 15 milliGRAM(s) IntraThecal once      STANDING MEDICATIONS  allopurinol 300 milliGRAM(s) Oral daily  benzonatate 100 milliGRAM(s) Oral three times a day  Biotene Dry Mouth Oral Rinse 15 milliLiter(s) Swish and Spit five times a day  chlorhexidine 2% Cloths 1 Application(s) Topical daily  dextrose 5%. 1000 milliLiter(s) IV Continuous <Continuous>  dextrose 5%. 1000 milliLiter(s) IV Continuous <Continuous>  dextrose 50% Injectable 25 Gram(s) IV Push once  dextrose 50% Injectable 12.5 Gram(s) IV Push once  dextrose 50% Injectable 25 Gram(s) IV Push once  filgrastim-sndz (ZARXIO) Injectable 480 MICROGram(s) SubCutaneous daily  furosemide   Injectable 40 milliGRAM(s) IV Push daily  glucagon  Injectable 1 milliGRAM(s) IntraMuscular once  glucagon  Injectable 1 milliGRAM(s) IntraMuscular once  influenza   Vaccine 0.5 milliLiter(s) IntraMuscular once  insulin glargine Injectable (LANTUS) 55 Unit(s) SubCutaneous at bedtime  insulin lispro (ADMELOG) corrective regimen sliding scale   SubCutaneous <User Schedule>  insulin lispro (ADMELOG) corrective regimen sliding scale   SubCutaneous three times a day before meals  insulin lispro Injectable (ADMELOG) 36 Unit(s) SubCutaneous before breakfast  insulin lispro Injectable (ADMELOG) 32 Unit(s) SubCutaneous before lunch  insulin lispro Injectable (ADMELOG) 28 Unit(s) SubCutaneous before dinner  levothyroxine 50 MICROGram(s) Oral daily  pantoprazole    Tablet 40 milliGRAM(s) Oral daily  polyethylene glycol 3350 17 Gram(s) Oral two times a day  predniSONE   Tablet 116 milliGRAM(s) Oral every 24 hours  sodium chloride 0.65% Nasal 1 Spray(s) Both Nostrils three times a day      PRN MEDICATIONS  acetaminophen     Tablet .. 650 milliGRAM(s) Oral every 6 hours PRN  aluminum hydroxide/magnesium hydroxide/simethicone Suspension 30 milliLiter(s) Oral once PRN  dextrose Oral Gel 15 Gram(s) Oral once PRN  diphenhydrAMINE 25 milliGRAM(s) Oral every 4 hours PRN  senna 2 Tablet(s) Oral two times a day PRN  sodium chloride 0.9% lock flush 10 milliLiter(s) IV Push every 1 hour PRN        Vital Signs Last 24 Hrs  T(C): 37.3 (03 Jul 2022 04:23), Max: 37.3 (03 Jul 2022 04:23)  T(F): 99.1 (03 Jul 2022 04:23), Max: 99.1 (03 Jul 2022 04:23)  HR: 85 (03 Jul 2022 04:23) (79 - 101)  BP: 116/75 (03 Jul 2022 04:23) (95/61 - 138/76)  RR: 20 (03 Jul 2022 04:23) (16 - 20)  SpO2: 98% (03 Jul 2022 04:23) (97% - 99%)    PHYSICAL EXAM  General: NAD, sitting in chair   HEENT:  anicteric sclera, no oral lesions  Neck: supple  CV: normal S1/S2 RRR  Lungs: CTA b/l, diminished at bases  Abdomen: soft non-tender non-distended, +BS  Ext: no BLE edema  Skin: no rashes   Neuro: alert and oriented X 3, no focal deficits  Central Line: C/D/I    Cultures:  Culture - Urine (06.28.22 @ 11:05)    Specimen Source: Clean Catch Clean Catch (Midstream)    Culture Results:   <10,000 CFU/mL Normal Urogenital Cecille    Culture - Blood (06.17.22 @ 16:33)    Specimen Source: .Blood Blood-Peripheral    Culture Results:   No Growth Final    Culture - Blood (06.17.22 @ 16:30)    Specimen Source: .Blood Blood-Peripheral    Culture Results:   No Growth Final    LABS:                        8.0    <0.10 )-----------( 23       ( 03 Jul 2022 07:01 )             23.5     03 Jul 2022 07:06    x      |  x      |  24     ----------------------------<  230    x       |  28     |  0.49     Ca    8.5        03 Jul 2022 07:06  Phos  2.6       03 Jul 2022 07:06  Mg     1.8       03 Jul 2022 07:06    TPro  6.3    /  Alb  3.2    /  TBili  1.4    /  DBili  x      /  AST  12     /  ALT  25     /  AlkPhos  93     03 Jul 2022 07:06    POCT Blood Glucose.: 281 mg/dL (03 Jul 2022 08:18)  POCT Blood Glucose.: 225 mg/dL (03 Jul 2022 02:20)  POCT Blood Glucose.: 136 mg/dL (02 Jul 2022 21:46)  POCT Blood Glucose.: 70 mg/dL (02 Jul 2022 17:24)  POCT Blood Glucose.: 167 mg/dL (02 Jul 2022 13:54)  POCT Blood Glucose.: 175 mg/dL (02 Jul 2022 12:29)  POCT Blood Glucose.: 312 mg/dL (02 Jul 2022 09:56)    LIVER FUNCTIONS - ( 03 Jul 2022 07:06 )  Alb: 3.2 g/dL / Pro: 6.3 g/dL / ALK PHOS: 93 U/L / ALT: 25 U/L / AST: 12 U/L / GGT: x             RADIOLOGY & ADDITIONAL STUDIES:

## 2022-09-12 ENCOUNTER — EMERGENCY (EMERGENCY)
Facility: HOSPITAL | Age: 52
LOS: 1 days | Discharge: HOME | End: 2022-09-12
Attending: STUDENT IN AN ORGANIZED HEALTH CARE EDUCATION/TRAINING PROGRAM
Payer: COMMERCIAL

## 2022-09-12 VITALS
OXYGEN SATURATION: 99 % | RESPIRATION RATE: 18 BRPM | SYSTOLIC BLOOD PRESSURE: 146 MMHG | HEART RATE: 100 BPM | HEIGHT: 66 IN | WEIGHT: 162.92 LBS | TEMPERATURE: 98 F | DIASTOLIC BLOOD PRESSURE: 89 MMHG

## 2022-09-12 DIAGNOSIS — Z98.890 OTHER SPECIFIED POSTPROCEDURAL STATES: Chronic | ICD-10-CM

## 2022-09-12 LAB
ALBUMIN SERPL ELPH-MCNC: 4.8 G/DL — SIGNIFICANT CHANGE UP (ref 3.3–5)
ALP SERPL-CCNC: 67 U/L — SIGNIFICANT CHANGE UP (ref 40–120)
ALT FLD-CCNC: 45 U/L — SIGNIFICANT CHANGE UP (ref 10–45)
ANION GAP SERPL CALC-SCNC: 12 MMOL/L — SIGNIFICANT CHANGE UP (ref 5–17)
APPEARANCE UR: ABNORMAL
AST SERPL-CCNC: 25 U/L — SIGNIFICANT CHANGE UP (ref 10–40)
BACTERIA # UR AUTO: NEGATIVE — SIGNIFICANT CHANGE UP
BASOPHILS # BLD AUTO: 0.07 K/UL — SIGNIFICANT CHANGE UP (ref 0–0.2)
BASOPHILS NFR BLD AUTO: 0.4 % — SIGNIFICANT CHANGE UP (ref 0–2)
BILIRUB SERPL-MCNC: 0.5 MG/DL — SIGNIFICANT CHANGE UP (ref 0.2–1.2)
BILIRUB UR-MCNC: NEGATIVE — SIGNIFICANT CHANGE UP
BUN SERPL-MCNC: 17 MG/DL — SIGNIFICANT CHANGE UP (ref 7–23)
CALCIUM SERPL-MCNC: 9.6 MG/DL — SIGNIFICANT CHANGE UP (ref 8.4–10.5)
CHLORIDE SERPL-SCNC: 100 MMOL/L — SIGNIFICANT CHANGE UP (ref 96–108)
CO2 SERPL-SCNC: 24 MMOL/L — SIGNIFICANT CHANGE UP (ref 22–31)
COLOR SPEC: YELLOW — SIGNIFICANT CHANGE UP
CREAT SERPL-MCNC: 0.87 MG/DL — SIGNIFICANT CHANGE UP (ref 0.5–1.3)
DIFF PNL FLD: ABNORMAL
EGFR: 105 ML/MIN/1.73M2 — SIGNIFICANT CHANGE UP
EOSINOPHIL # BLD AUTO: 0.07 K/UL — SIGNIFICANT CHANGE UP (ref 0–0.5)
EOSINOPHIL NFR BLD AUTO: 0.4 % — SIGNIFICANT CHANGE UP (ref 0–6)
EPI CELLS # UR: 1 /HPF — SIGNIFICANT CHANGE UP
GLUCOSE SERPL-MCNC: 152 MG/DL — HIGH (ref 70–99)
GLUCOSE UR QL: NEGATIVE — SIGNIFICANT CHANGE UP
HCT VFR BLD CALC: 42.7 % — SIGNIFICANT CHANGE UP (ref 39–50)
HGB BLD-MCNC: 13.5 G/DL — SIGNIFICANT CHANGE UP (ref 13–17)
HYALINE CASTS # UR AUTO: 0 /LPF — SIGNIFICANT CHANGE UP (ref 0–2)
IMM GRANULOCYTES NFR BLD AUTO: 1.1 % — SIGNIFICANT CHANGE UP (ref 0–1.5)
KETONES UR-MCNC: NEGATIVE — SIGNIFICANT CHANGE UP
LEUKOCYTE ESTERASE UR-ACNC: ABNORMAL
LIDOCAIN IGE QN: 51 U/L — SIGNIFICANT CHANGE UP (ref 7–60)
LYMPHOCYTES # BLD AUTO: 1.94 K/UL — SIGNIFICANT CHANGE UP (ref 1–3.3)
LYMPHOCYTES # BLD AUTO: 11 % — LOW (ref 13–44)
MCHC RBC-ENTMCNC: 27.4 PG — SIGNIFICANT CHANGE UP (ref 27–34)
MCHC RBC-ENTMCNC: 31.6 GM/DL — LOW (ref 32–36)
MCV RBC AUTO: 86.8 FL — SIGNIFICANT CHANGE UP (ref 80–100)
MONOCYTES # BLD AUTO: 0.61 K/UL — SIGNIFICANT CHANGE UP (ref 0–0.9)
MONOCYTES NFR BLD AUTO: 3.5 % — SIGNIFICANT CHANGE UP (ref 2–14)
NEUTROPHILS # BLD AUTO: 14.78 K/UL — HIGH (ref 1.8–7.4)
NEUTROPHILS NFR BLD AUTO: 83.6 % — HIGH (ref 43–77)
NITRITE UR-MCNC: NEGATIVE — SIGNIFICANT CHANGE UP
NRBC # BLD: 0 /100 WBCS — SIGNIFICANT CHANGE UP (ref 0–0)
PH UR: 5.5 — SIGNIFICANT CHANGE UP (ref 5–8)
PLATELET # BLD AUTO: 433 K/UL — HIGH (ref 150–400)
POTASSIUM SERPL-MCNC: 3.9 MMOL/L — SIGNIFICANT CHANGE UP (ref 3.5–5.3)
POTASSIUM SERPL-SCNC: 3.9 MMOL/L — SIGNIFICANT CHANGE UP (ref 3.5–5.3)
PROT SERPL-MCNC: 8.2 G/DL — SIGNIFICANT CHANGE UP (ref 6–8.3)
PROT UR-MCNC: ABNORMAL
RBC # BLD: 4.92 M/UL — SIGNIFICANT CHANGE UP (ref 4.2–5.8)
RBC # FLD: 13.2 % — SIGNIFICANT CHANGE UP (ref 10.3–14.5)
RBC CASTS # UR COMP ASSIST: 7 /HPF — HIGH (ref 0–4)
SODIUM SERPL-SCNC: 136 MMOL/L — SIGNIFICANT CHANGE UP (ref 135–145)
SP GR SPEC: 1.02 — SIGNIFICANT CHANGE UP (ref 1.01–1.02)
UROBILINOGEN FLD QL: NEGATIVE — SIGNIFICANT CHANGE UP
WBC # BLD: 17.67 K/UL — HIGH (ref 3.8–10.5)
WBC # FLD AUTO: 17.67 K/UL — HIGH (ref 3.8–10.5)
WBC UR QL: 415 /HPF — HIGH (ref 0–5)

## 2022-09-12 PROCEDURE — 99284 EMERGENCY DEPT VISIT MOD MDM: CPT | Mod: 25

## 2022-09-12 PROCEDURE — 99285 EMERGENCY DEPT VISIT HI MDM: CPT

## 2022-09-12 PROCEDURE — 36415 COLL VENOUS BLD VENIPUNCTURE: CPT

## 2022-09-12 PROCEDURE — 81001 URINALYSIS AUTO W/SCOPE: CPT

## 2022-09-12 PROCEDURE — 80053 COMPREHEN METABOLIC PANEL: CPT

## 2022-09-12 PROCEDURE — 74176 CT ABD & PELVIS W/O CONTRAST: CPT | Mod: MA

## 2022-09-12 PROCEDURE — 74176 CT ABD & PELVIS W/O CONTRAST: CPT | Mod: 26,MA

## 2022-09-12 PROCEDURE — 83690 ASSAY OF LIPASE: CPT

## 2022-09-12 PROCEDURE — 85025 COMPLETE CBC W/AUTO DIFF WBC: CPT

## 2022-09-12 RX ORDER — AZTREONAM 2 G
1 VIAL (EA) INJECTION
Qty: 84 | Refills: 0
Start: 2022-09-12 | End: 2022-10-23

## 2022-09-12 RX ADMIN — Medication 1 TABLET(S): at 10:20

## 2022-09-12 NOTE — ED ADULT TRIAGE NOTE - BMI (KG/M2)
Patient Education     Female Urinary Tract Infection (Child)   Your child has a urinary tract infection.  Bacteria most often don't stay in urine. When they do, the urine can become infected. This is called a urinary tract infection (UTI). An infection can happen any place in the urinary tract, from the kidney to the bladder and urethra. The urethra in a girl is the tube that drains the urine from the bladder through an opening in front of the vagina.  Bladder infection, UTI, and cystitis are often used to describe the same health problem. But they are not always the same. Cystitis is an inflammation of the bladder. The most common cause of cystitis is an infection.  The most common place for a UTI is in the bladder. When this happens, it is called a bladder infection. This is a common infection in children. Most bladder infections can be treated, and are not serious. But a UTI can also harm the kidneys. The symptoms of a kidney infection are worse. The infection is more serious because it can harm the kidneys.   Key points to know  · Infections in the urine or any place in the urinary tract are called UTIs.  · Cystitis is most often caused by a UTI.  · Bladder infections are the most common type of cystitis.  · Not all UTIs and cases of cystitis are bladder infections.  · A UTI can cause a kidney infection. This is less common than a bladder infection.  · Most people with a bladder infection don't have a kidney infection.  · You can have a kidney infection without a bladder infection.  The symptoms that your child has often depend on her age. With a younger child, the symptoms are less clear. Your child may have a hard time telling or showing you where it hurts.  The infection causes inflammation in the urethra and bladder. This causes many of the symptoms. The most common symptoms of a UTI are:  · Pain or burning when urinating. Your child may cry when urinating or not want to urinate because of the pain.  · Girls  may curtsy trying to hold in the urine  · Having to go to the bathroom more often than normal  · Your child feels like she needs to go right away  · Only a small amount of urine comes out  · Blood in urine  · Belly (abdominal) pain  · Cloudy, dark, strong, or bad-smelling urine  · Your child can't urinate (urinary retention)  · Bedwetting (urinary incontinence)  · Fever  · Chills  · Back pain  · Feeling grouchy  · Loss of appetite  UTIs can't be passed from person to person. You can't get one from some other person, from a toilet seat, or by sharing a bath.  The most common cause of bladder infections in children is bacteria from the bowels. The bacteria can get onto the skin around the urethra, and then into the urine. From there they can travel up into the bladder. This causes inflammation and an infection. This most often happens because of:  · Wiping from back to front after using the toilet. This moves bacteria to the urethra from the stool.  · Poor cleaning of the genitals  Other causes include:  · Not fully emptying the bladder. Bacteria don't pass out as often, so they are able to multiply.  · Constipation. This can cause the bowels to push on the bladder or urethra and keep the bladder from emptying.  · Dehydration. This lets the urine stay in the bladder longer.  · Irritation of the urethra from soaps, bubble baths, or tight clothes. This makes it easier for bacteria to cause an infection.  UTIs are diagnosed by the symptoms and a urine test. They are treated with antibiotics and most often go away quickly without problems. Treatment helps stop the UTI from becoming a more serious kidney infection.  Home care  Your child’s healthcare provider prescribed antibiotics for the infection. Have your child take the antibiotics until they are all gone, unless the provider tells you to stop. She should take the medicine even if she feels better. This is to make sure the infection has cleared up.   Ask the provider  if you can give acetaminophen or ibuprofen for pain, fever, or fussiness. Don't give ibuprofen to children younger than 6 months old.  If your child has long-term (chronic) liver or kidney disease, talk with your child’s provider before using these medicines. Also talk with the provider if your child has had a stomach ulcer or GI (gastrointestinal bleeding), or is taking blood thinners.  Don’t give aspirin (or medicine that contains aspirin) to a child younger than age 19 unless directed by your child’s provider. Taking aspirin can put your child at risk for Reye syndrome. This is a rare but very serious disorder. It most often affects the brain and the liver.  Preventing UTIs  · Teach your child to wipe from front to back after using the toilet.  · Give your child enough liquids to drink to prevent dehydration and flush out the bladder.  · Have your child wear loose-fitting clothes and cotton underwear. This helps keep the genital area clean and dry.  · Change dirty diapers or underwear as soon as you can. This will help prevent irritation, which can lead to infection.  · Encourage your child to urinate more often. Tell your child not to wait a long time before urinating.  · Give your child healthy foods to prevent constipation. This includes more fresh fruits and vegetables, more fiber, and less junk and fatty foods.    Follow-up care  Follow up with your child’s healthcare provider, or as advised. This is especially important if your child has infections that happen over and over again.  If a culture was done, you will be told if the treatment needs to be changed. You can call as directed for the results.  Call 911  Call 911 if any of these occur:  · Trouble breathing  · Trouble waking up  · Fainting or loss of consciousness  · Fast heart rate  · Seizure  When to seek medical advice  Call your child’s healthcare provider right away if any of these occur:  · Your child does not start to get better after 24 hours  of treatment  · Still has any symptoms after 3 days of treatment  · Fever (see Fever and children, below) provider  · Upset stomach (nausea), vomiting, or can't keep down medicines  · Belly or back pain  · Vaginal discharge  · Pain, swelling, or redness in the outer vaginal area (labia)  Fever and children  Use a digital thermometer to check your child’s temperature. Don’t use a mercury thermometer. There are different kinds of digital thermometers. They include ones for the mouth, ear, forehead (temporal), rectum, or armpit. Ear temperatures aren’t accurate before 6 months of age. Don’t take an oral temperature until your child is at least 4 years old.  Use a rectal thermometer with care. It may accidentally poke a hole in the rectum. It may pass on germs from the stool. Follow the product maker’s directions for correct use. If you don’t feel OK using a rectal thermometer, use another type. When you talk to your child’s healthcare provider, tell him or her which type you used.  Below are guidelines to know if your child has a fever. Your child’s healthcare provider may give you different numbers for your child.  A baby under 3 months old:  · First, ask your child’s healthcare provider how you should take the temperature.  · Rectal or forehead: 100.4°F (38°C) or higher  · Armpit: 99°F (37.2°C) or higher  A child age 3 months to 36 months (3 years):   · Rectal, forehead, or ear: 102°F (38.9°C) or higher  · Armpit: 101°F (38.3°C) or higher  Call the healthcare provider in these cases:   · Repeated temperature of 104°F (40°C) or higher  · Fever that lasts more than 24 hours in a child under age 2  · Fever that lasts for 3 days in a child age 2 or older  [employer] last reviewed this educational content on 9/1/2019 © 2000-2021 [employer]. All rights reserved. This information is not intended as a substitute for professional medical care. Always follow your healthcare professional's instructions.            26.3 3d

## 2022-09-12 NOTE — ED ADULT NURSE NOTE - OBJECTIVE STATEMENT
52M aaox4 ambulatory with no PMHx, p/w c/o 3 days of intermittent flank and rt sided abdominal pain accompanied by nausea and subjective fever and chills. Patient reports he was recently treated for UTI and finished the course of antibiotics, but patient reports symptoms persists. Sexually active with one female partner. Denies any hematuria. VS WDL in the ED. RT AC 18 g IV access inserted, pending labs orders. Yes

## 2022-09-12 NOTE — ED PROVIDER NOTE - PHYSICAL EXAMINATION
CONSTITUTIONAL: well-appearing, in NAD  SKIN: Warm dry, normal skin turgor  HEAD: NCAT  EYES: EOMI, PERRLA, no scleral icterus, conjunctiva pink  ENT: normal pharynx with no erythema or exudates  NECK: Supple; non tender. Full ROM.  CARD: RRR, no murmurs.  RESP: clear to ausculation b/l. No crackles or wheezing.  ABD: soft, non-tender, non-distended, no rebound or guarding.  MSK: Full ROM, no bony tenderness, no pedal edema, no calf tenderness  NEURO: normal motor. normal sensory. CN II-XII intact. Cerebellar testing normal. Normal gait.  PSYCH: Cooperative, appropriate. CONSTITUTIONAL: well-appearing, in NAD  SKIN: Warm dry  HEAD: NCAT  EYES: no scleral icterus, conjunctiva pink  NECK: Supple; non tender. Full ROM.  CARD: RRR, no murmurs.  RESP: clear to ausculation b/l. No crackles or wheezing.  ABD: soft, non-tender, non-distended, no rebound or guarding. No CVAT.   MSK: no pedal edema, no calf tenderness. No midline spinal pain.  NEURO: normal motor 5/5 strength in all extrem. Normal sensation in all limbs.  PSYCH: Cooperative, appropriate.

## 2022-09-12 NOTE — ED ADULT NURSE NOTE - NSIMPLEMENTINTERV_GEN_ALL_ED
Implemented All Universal Safety Interventions:  [place] to call system. Call bell, personal items and telephone within reach. Instruct patient to call for assistance. Room bathroom lighting operational. Non-slip footwear when patient is off stretcher. Physically safe environment: no spills, clutter or unnecessary equipment. Stretcher in lowest position, wheels locked, appropriate side rails in place.

## 2022-09-12 NOTE — ED PROVIDER NOTE - NS ED ROS FT
Constitutional:  (-) fever, (-) chills, (-) lethargy  Eyes:  (-) eye pain (-) visual changes  ENMT: (-) nasal discharge, (-) sore throat. (-) neck pain or stiffness  Cardiac: (-) chest pain (-) palpitations  Respiratory:  (-) cough (-) respiratory distress.   GI:  (-) nausea (-) vomiting (-) diarrhea (-) abdominal pain.  :  (-) dysuria (-) frequency (-) burning.  MS:  (-) back pain (-) joint pain.  Neuro:  (-) headache (-) numbness (-) tingling (-) focal weakness  Skin:  (-) rash  Except as documented in the HPI,  all other systems are negative Constitutional:  (-) fever, (-) chills, (-) lethargy  Eyes:  (-) eye pain  ENMT: (-) sore throat  Cardiac: (-) chest pain   Respiratory:  (-) cough   GI:  (-) nausea (-) vomiting (-) diarrhea (+) subpubic abdominal pain.  :  (+) dysuria (+) frequency (+) burning.  MS:  (+) back pain (-) joint pain.  Neuro: (-) focal weakness  Skin:  (-) rash  Except as documented in the HPI,  all other systems are negative

## 2022-09-12 NOTE — ED PROVIDER NOTE - OBJECTIVE STATEMENT
52y hx of *** currently on for UTI, complaining of flank pain *** days. 52y pre-diabetic complaining of b/l more-r-sided, flank pain 2x days. 2 weeks ago, chills and polyuria. 52y pre-diabetic complaining of b/l more-r-sided, flank pain 2x days. 2 weeks ago, chills and polyuria when he attempted to urinate. Felt he was not emptying his bladder completely. Going bathroom >10x daily. B/l 7/10 flank pain, worse on R side, radiating to his R inner groin only when he attempted to urinate. At the time, no subjective fevers, no n/v/d. No hematuria. No pyuria. No penile discharge.    Went to UC and was given cipro. No imaging done. Culture was apparently taken but no results were given to pt. Felt better after a few days on tx. Pt finished cipro course this thursday. On saturday, same pain occurred w/ same ROS.

## 2022-09-12 NOTE — ED PROVIDER NOTE - PROGRESS NOTE DETAILS
UA positive, culture sent, CT showing probable prostatitis. Has f/up w/ PCP at end of week. Will d/c with bactrim for 6 weeks.

## 2022-09-12 NOTE — ED PROVIDER NOTE - NSFOLLOWUPINSTRUCTIONS_ED_ALL_ED_FT
- You came to the emergency room for urinary pain .  - We did: imaging of your pelvis and testing of your urine.  - Your testing/exams was/were reassuring that dangerous emergencies/conditions are less likely to be occurring or to have occurred.  - You were diagnosed with: prostatitis, an inflammation of your prostate. An a UTI as well, infxn of your urine.    - Take all medications as directed.    - For pain or fever you can take ibuprofen (motrin, advil) or acetaminophen (tylenol) as needed, as directed on packaging.  - Follow up with your primary doctor within 5 days as directed.  - If you had labs or imaging done, you were given copies of all labs and/or imaging results from your er visit--please take them with you to your follow up appointments.  - If needed, call patient access services at [phone] to find a primary care physician (PCP). Call this number to follow up with a specialty service, such as the spine clinic. If you need this, call and say you were recently in the emergency department and you are calling, per my orders.   - Make sure you do not require a primary care physician's referral if you make a specialty clinic appointment directly. Some insurance requires you to see your PCP, get a referral, then make a specialty appointment.   - Return to the emergency department for any worsening symptoms or concerns, such as worsening pain, fevers that do not resolve. - You came to the emergency room for urinary pain .  - We did: imaging of your pelvis and testing of your urine.  - Your testing/exams was/were reassuring that dangerous emergencies/conditions are less likely to be occurring or to have occurred.  - You were diagnosed with: prostatitis, an inflammation of your prostate. An a UTI as well, infxn of your urine.    - Take all medications as directed. Will give 6 weeks of abx for prostatitis.    - For pain or fever you can take ibuprofen (motrin, advil) or acetaminophen (tylenol) as needed, as directed on packaging.  - Follow up with your primary doctor within 5 days as directed.  - If you had labs or imaging done, you were given copies of all labs and/or imaging results from your er visit--please take them with you to your follow up appointments.  - If needed, call patient access services at [phone] to find a primary care physician (PCP). Call this number to follow up with a specialty service, such as the spine clinic. If you need this, call and say you were recently in the emergency department and you are calling, per my orders.   - Make sure you do not require a primary care physician's referral if you make a specialty clinic appointment directly. Some insurance requires you to see your PCP, get a referral, then make a specialty appointment.   - Return to the emergency department for any worsening symptoms or concerns, such as worsening pain, fevers that do not resolve.

## 2022-09-12 NOTE — ED PROVIDER NOTE - CLINICAL SUMMARY MEDICAL DECISION MAKING FREE TEXT BOX
52 yom no relevant hx, p/w flank pain and dysuria after completing abx for suspected UTI.     ddx includes simple UTI vs pyelo vs prostatitis vs nephrolithiasis vs BPH vs prostatitis     No pain rn, will tx pain as needed. CTAP non-con i/s/o possible pyelo vs stone vs prostatitis. Labs. Not concerned for ACS or PE given hx or PE given normal vitals and radiation of lower pain to his groin and concomitant dysuria and suprapubic pain. Was better after abx. Likely dispo home vw/ either cipro or keflex and w/ be tailored pending a urine culture.

## 2022-09-12 NOTE — ED PROVIDER NOTE - PATIENT PORTAL LINK FT
You can access the FollowMyHealth Patient Portal offered by [facility] by registering at the following website: [email or web address]. By joining [employer]’s FollowMyHealth portal, you will also be able to view your health information using other applications (apps) compatible with our system.

## 2022-09-12 NOTE — ED ADULT TRIAGE NOTE - CHIEF COMPLAINT QUOTE
intermittent Right sided flank pain x2 days. Denies N/V. Pt being treated for UTI, fevers last week.

## 2022-09-12 NOTE — ED PROVIDER NOTE - ATTENDING CONTRIBUTION TO CARE
I, Rosalio Jules, performed a history and physical exam of the patient and discussed their management with the resident and/or advanced care provider. I reviewed the resident and/or advanced care provider's note and agree with the documented findings and plan of care. I was present and available for all procedures.    52-year-old male with past medical history of prediabetes coming in complaining of right-sided flank pain for the last 2 weeks.  Reports feeling worsening frequency and inability to urinate fully and empty his bladder fully since then.  Reports having slight chills so went to urgent care given ciprofloxacin prescription for urinary tract infection.  Finished antibiotics on Thursday with persistent symptoms.  Came to ER for further evaluation.  Unknown culture results.  Denies penile discharge or change of urine culture.  Also feels some pressure in rectum but having normal bowel movements.  Only past surgical history is right lower quadrant hernia repair. Denies recent trauma, fevers, chills, headache, dizziness, nausea, vomiting, dysuria, hematuria, diarrhea, constipation, chest pain, shortness of breath, cough.    Well appearing and in NAD, head normal appearing atraumatic, trachea midline, no respiratory distress, lungs cta bilaterally, rrr no murmurs, soft NT ND abdomen, no visible extremity deformities, Alert and oriented, non focal neuro exam, skin warm and dry, normal affect and mood, No CVA tenderness to palpation, No leg swelling JVD    Concerning for pyelonephritis versus nephrolithiasis versus possible prostatitis otherwise well-appearing not septic normal vital signs and exam unlikely ACS PE or atypical chest symptoms will evaluate with CAT scan screening blood work urine dispo pending evaluation

## 2022-09-24 ENCOUNTER — EMERGENCY (EMERGENCY)
Facility: HOSPITAL | Age: 52
LOS: 1 days | Discharge: HOME | End: 2022-09-24
Attending: STUDENT IN AN ORGANIZED HEALTH CARE EDUCATION/TRAINING PROGRAM
Payer: COMMERCIAL

## 2022-09-24 VITALS
DIASTOLIC BLOOD PRESSURE: 93 MMHG | HEART RATE: 75 BPM | RESPIRATION RATE: 16 BRPM | OXYGEN SATURATION: 100 % | SYSTOLIC BLOOD PRESSURE: 133 MMHG

## 2022-09-24 VITALS
RESPIRATION RATE: 16 BRPM | TEMPERATURE: 99 F | OXYGEN SATURATION: 99 % | DIASTOLIC BLOOD PRESSURE: 81 MMHG | SYSTOLIC BLOOD PRESSURE: 130 MMHG | HEART RATE: 90 BPM | WEIGHT: 160.06 LBS | HEIGHT: 66 IN

## 2022-09-24 DIAGNOSIS — Z98.890 OTHER SPECIFIED POSTPROCEDURAL STATES: Chronic | ICD-10-CM

## 2022-09-24 PROBLEM — Z78.9 OTHER SPECIFIED HEALTH STATUS: Chronic | Status: ACTIVE | Noted: 2022-09-12

## 2022-09-24 LAB
ALBUMIN SERPL ELPH-MCNC: 4.7 G/DL — SIGNIFICANT CHANGE UP (ref 3.3–5)
ALP SERPL-CCNC: 83 U/L — SIGNIFICANT CHANGE UP (ref 40–120)
ALT FLD-CCNC: 131 U/L — HIGH (ref 10–45)
ANION GAP SERPL CALC-SCNC: 13 MMOL/L — SIGNIFICANT CHANGE UP (ref 5–17)
AST SERPL-CCNC: 100 U/L — HIGH (ref 10–40)
BASOPHILS # BLD AUTO: 0.01 K/UL — SIGNIFICANT CHANGE UP (ref 0–0.2)
BASOPHILS NFR BLD AUTO: 0.2 % — SIGNIFICANT CHANGE UP (ref 0–2)
BILIRUB SERPL-MCNC: 0.3 MG/DL — SIGNIFICANT CHANGE UP (ref 0.2–1.2)
BUN SERPL-MCNC: 15 MG/DL — SIGNIFICANT CHANGE UP (ref 7–23)
CALCIUM SERPL-MCNC: 9.2 MG/DL — SIGNIFICANT CHANGE UP (ref 8.4–10.5)
CHLORIDE SERPL-SCNC: 97 MMOL/L — SIGNIFICANT CHANGE UP (ref 96–108)
CO2 SERPL-SCNC: 23 MMOL/L — SIGNIFICANT CHANGE UP (ref 22–31)
CREAT SERPL-MCNC: 1.13 MG/DL — SIGNIFICANT CHANGE UP (ref 0.5–1.3)
EGFR: 79 ML/MIN/1.73M2 — SIGNIFICANT CHANGE UP
EOSINOPHIL # BLD AUTO: 0.09 K/UL — SIGNIFICANT CHANGE UP (ref 0–0.5)
EOSINOPHIL NFR BLD AUTO: 1.9 % — SIGNIFICANT CHANGE UP (ref 0–6)
GLUCOSE SERPL-MCNC: 113 MG/DL — HIGH (ref 70–99)
HCT VFR BLD CALC: 43.3 % — SIGNIFICANT CHANGE UP (ref 39–50)
HGB BLD-MCNC: 13.8 G/DL — SIGNIFICANT CHANGE UP (ref 13–17)
IMM GRANULOCYTES NFR BLD AUTO: 0.4 % — SIGNIFICANT CHANGE UP (ref 0–0.9)
LYMPHOCYTES # BLD AUTO: 1.04 K/UL — SIGNIFICANT CHANGE UP (ref 1–3.3)
LYMPHOCYTES # BLD AUTO: 22.3 % — SIGNIFICANT CHANGE UP (ref 13–44)
MCHC RBC-ENTMCNC: 26.7 PG — LOW (ref 27–34)
MCHC RBC-ENTMCNC: 31.9 GM/DL — LOW (ref 32–36)
MCV RBC AUTO: 83.9 FL — SIGNIFICANT CHANGE UP (ref 80–100)
MONOCYTES # BLD AUTO: 0.47 K/UL — SIGNIFICANT CHANGE UP (ref 0–0.9)
MONOCYTES NFR BLD AUTO: 10.1 % — SIGNIFICANT CHANGE UP (ref 2–14)
NEUTROPHILS # BLD AUTO: 3.03 K/UL — SIGNIFICANT CHANGE UP (ref 1.8–7.4)
NEUTROPHILS NFR BLD AUTO: 65.1 % — SIGNIFICANT CHANGE UP (ref 43–77)
NRBC # BLD: 0 /100 WBCS — SIGNIFICANT CHANGE UP (ref 0–0)
PLATELET # BLD AUTO: 191 K/UL — SIGNIFICANT CHANGE UP (ref 150–400)
POTASSIUM SERPL-MCNC: 4.7 MMOL/L — SIGNIFICANT CHANGE UP (ref 3.5–5.3)
POTASSIUM SERPL-SCNC: 4.7 MMOL/L — SIGNIFICANT CHANGE UP (ref 3.5–5.3)
PROT SERPL-MCNC: 8.2 G/DL — SIGNIFICANT CHANGE UP (ref 6–8.3)
RAPID RVP RESULT: SIGNIFICANT CHANGE UP
RBC # BLD: 5.16 M/UL — SIGNIFICANT CHANGE UP (ref 4.2–5.8)
RBC # FLD: 13.1 % — SIGNIFICANT CHANGE UP (ref 10.3–14.5)
SARS-COV-2 RNA SPEC QL NAA+PROBE: SIGNIFICANT CHANGE UP
SODIUM SERPL-SCNC: 133 MMOL/L — LOW (ref 135–145)
WBC # BLD: 4.66 K/UL — SIGNIFICANT CHANGE UP (ref 3.8–10.5)
WBC # FLD AUTO: 4.66 K/UL — SIGNIFICANT CHANGE UP (ref 3.8–10.5)

## 2022-09-24 PROCEDURE — 36415 COLL VENOUS BLD VENIPUNCTURE: CPT

## 2022-09-24 PROCEDURE — 0225U NFCT DS DNA&RNA 21 SARSCOV2: CPT

## 2022-09-24 PROCEDURE — 85025 COMPLETE CBC W/AUTO DIFF WBC: CPT

## 2022-09-24 PROCEDURE — 99284 EMERGENCY DEPT VISIT MOD MDM: CPT

## 2022-09-24 PROCEDURE — 80053 COMPREHEN METABOLIC PANEL: CPT

## 2022-09-24 PROCEDURE — 99283 EMERGENCY DEPT VISIT LOW MDM: CPT

## 2022-09-24 RX ORDER — DIPHENHYDRAMINE HCL 50 MG
1 CAPSULE ORAL
Qty: 9 | Refills: 0
Start: 2022-09-24 | End: 2022-09-26

## 2022-09-24 RX ORDER — DIPHENHYDRAMINE HCL 50 MG
25 CAPSULE ORAL ONCE
Refills: 0 | Status: COMPLETED | OUTPATIENT
Start: 2022-09-24 | End: 2022-09-24

## 2022-09-24 RX ORDER — CIPROFLOXACIN LACTATE 400MG/40ML
1 VIAL (ML) INTRAVENOUS
Qty: 56 | Refills: 0
Start: 2022-09-24 | End: 2022-10-21

## 2022-09-24 RX ORDER — FAMOTIDINE 10 MG/ML
20 INJECTION INTRAVENOUS ONCE
Refills: 0 | Status: COMPLETED | OUTPATIENT
Start: 2022-09-24 | End: 2022-09-24

## 2022-09-24 RX ADMIN — Medication 25 MILLIGRAM(S): at 12:45

## 2022-09-24 RX ADMIN — Medication 40 MILLIGRAM(S): at 12:46

## 2022-09-24 RX ADMIN — FAMOTIDINE 20 MILLIGRAM(S): 10 INJECTION INTRAVENOUS at 12:45

## 2022-09-24 NOTE — ED PROVIDER NOTE - NSICDXFAMILYHX_GEN_ALL_CORE_FT
FAMILY HISTORY:  Family history of hypertension in father    Father  Still living? Unknown  FH: diabetes mellitus, Age at diagnosis: Age Unknown

## 2022-09-24 NOTE — ED PROVIDER NOTE - NSFOLLOWUPINSTRUCTIONS_ED_ALL_ED_FT
Rash    A rash is a change in the color of the skin. A rash can also change the way your skin feels. There are many different conditions and factors that can cause a rash, most of which are not dangerous. Make sure to follow up with your primary care physician or a dermatologist as instructed by your health care provider.    SEEK IMMEDIATE MEDICAL CARE IF YOU HAVE ANY OF THE FOLLOWING SYMPTOMS: fever, blisters, a rash inside your mouth, vaginal or anal pain, or altered mental status. Rash    A rash is a change in the color of the skin. A rash can also change the way your skin feels. There are many different conditions and factors that can cause a rash, most of which are not dangerous. Make sure to follow up with your primary care physician or a dermatologist as instructed by your health care provider.    SEEK IMMEDIATE MEDICAL CARE IF YOU HAVE ANY OF THE FOLLOWING SYMPTOMS: fever, blisters, a rash inside your mouth, vaginal or anal pain, or altered mental status.    Please STOP taking BACTRIM and   START taking CIPROFLOXACIN    Continue Benadryl as needed for rash/fever    Someone will call you for a dermatology appointment. If you do not hear from anyone please call [phone] for assistance. Rash    A rash is a change in the color of the skin. A rash can also change the way your skin feels. There are many different conditions and factors that can cause a rash, most of which are not dangerous. Make sure to follow up with your primary care physician or a dermatologist as instructed by your health care provider.    SEEK IMMEDIATE MEDICAL CARE IF YOU HAVE ANY OF THE FOLLOWING SYMPTOMS: fever, blisters, a rash inside your mouth, vaginal or anal pain, or altered mental status.    Please STOP taking BACTRIM and   START taking CIPROFLOXACIN  Take the steroids as prescribed.     Continue Benadryl as needed for rash/fever    Someone will call you for a dermatology appointment. If you do not hear from anyone please call [phone] for assistance.

## 2022-09-24 NOTE — ED PROVIDER NOTE - PHYSICAL EXAMINATION
CONSTITUTIONAL: Well appearing and in no apparent distress.  ENT: Airway patent, moist mucous membranes. No oral lesions. No stridor. Tolerating secretions.   EYES: Pupils equal, round and reactive to light. EOMI. Conjunctiva normal appearing.   CARDIAC: Normal rate, regular rhythm.  Heart sounds S1, S2.    RESPIRATORY: Breath sounds clear and equal bilaterally. Speaking in full sentences.   GASTROINTESTINAL: Abdomen soft, non-tender, not distended.  MUSCULOSKELETAL: Spine appears normal.  NEUROLOGICAL: Alert and oriented x3, no focal deficits, no motor or sensory deficits. 5/5 muscle strength throughout.  SKIN: Skin normal color, warm, dry and intact.   +Diffuse erythematous papular rash on body. Not in inguinal folds. Spares mucosal areas.   PSYCHIATRIC: Normal mood and affect.

## 2022-09-24 NOTE — ED PROVIDER NOTE - PATIENT PORTAL LINK FT
You can access the FollowMyHealth Patient Portal offered by [facility] by registering at the following website: [email or web address]. By joining [employer]’s FollowMyHealth portal, you will also be able to view your health information using other applications (apps) compatible with our system.

## 2022-09-24 NOTE — ED PROVIDER NOTE - CLINICAL SUMMARY MEDICAL DECISION MAKING FREE TEXT BOX
Attending/MD Janet. 53 yo M, with DM, prostitits 2 wks ago, completing the course of abx with Bactrim, p/w rash, broad, patchy on the back, no change in his lifestyle, likely drug allergy, from bactrim, no signs of SJS at this moment given no mucosal involvement, normal vitals, appears not toxic, predonisolone and benadryle, derm follow up, likely dc

## 2022-09-24 NOTE — ED PROVIDER NOTE - OBJECTIVE STATEMENT
No 51 yo M with a PMH of preDM p/w diffuse red and itchy rash on body that he noticed yesterday. Persisted today so came in for eval. Has never had before, denies hx of allergies. Also c/o upper back/neck and bilateral hip pain. Did not take pain meds today. States he feels sore. +subjective fever/chills last night. Has been taking Bactrim since 09/13/22 for presumed prostatitis. No one else in family with same rash. No recent travel or outdoor exposure. No new products (detergents/soaps/colognes, etc.). 53 yo M with a PMH of preDM p/w diffuse red and itchy rash on body that he noticed yesterday. Persisted today so came in for eval. Has never had before, denies hx of allergies. Also c/o upper back/neck and bilateral hip pain. Did not take pain meds today. States he feels sore. +subjective fever/chills last night. Has been taking Bactrim since 09/13/22 for presumed prostatitis. No one else in family with same rash. No recent travel or outdoor exposure. No new products (detergents/soaps/colognes, etc.). Denies nausea, vomiting, chest pain, SOB, cough, sore throat, congestion/runny nose, tongue/throat swelling, eye discharge/swelling, change in voice, drooling, abd pain, headache. No sick contacts. 53 yo M with a PMH of preDM p/w diffuse red and itchy rash on body that he noticed yesterday. Persisted today so came in for eval. Has never had before, denies hx of allergies. Also c/o upper back/neck and bilateral hip pain as well as feeling "achy" all over. Did not take pain meds today. States he feels sore. +subjective fever/chills last night. Has been taking Bactrim since 09/13/22 for presumed prostatitis. No one else in family with same rash. No recent travel or outdoor exposure. No new products (detergents/soaps/colognes, etc.). Denies nausea, vomiting, chest pain, SOB, cough, sore throat, congestion/runny nose, tongue/throat swelling, eye discharge/swelling, change in voice, drooling, abd pain, headache. No sick contacts.

## 2022-09-24 NOTE — ED PROVIDER NOTE - PROGRESS NOTE DETAILS
Pt informed of lab results, to f/u hyponatremia and mildly elevated LFts with PMD. Agrees with plan  Aware to stop Bactrim and start Cipro for remaining 4 weeks. Also recommended benadryl as needed for rash/itching. Derm referral placed in chart. Patient stable for discharge.  Follow up instructions given, return to ED precautions reviewed. Importance of follow up emphasized, patient verbalized understanding.  All questions answered. Mendy Knowles PA-C Pt informed of lab results, to f/u hyponatremia and mildly elevated LFts with PMD. Agrees with plan  Aware to stop Bactrim and start Cipro for remaining 4 weeks. Also recommended benadryl as needed for rash/itching and continued steroids. Will call with RVP results. Derm referral placed in chart. Patient stable for discharge.  Follow up instructions given, return to ED precautions reviewed. Importance of follow up emphasized, patient verbalized understanding.  All questions answered. Mendy Knowles PA-C

## 2022-09-24 NOTE — ED PROVIDER NOTE - ATTENDING APP SHARED VISIT CONTRIBUTION OF CARE
I have personally seen and examined this patient.  I have fully participated in the care of this patient. I performed a substantive portion of the visit including all aspects of the medical decision making. I have reviewed all pertinent clinical information, including history, physical exam, plan and the ACP’s note and agree except as noted. - MD Janet.     see my MDM

## 2022-09-24 NOTE — ED ADULT NURSE NOTE - OBJECTIVE STATEMENT
Pt is a 51 yo male with the co body aches x 2 days and a full body rash x 1 day. Pt states that two days ago he began having intermittent neck, shoulder and hip pain, he states the hip pain is the most uncomfortable. He denies anything making the pain better or worse and is unsure what causes the pain. Pt was seen in ED recently and is being treated with Bactrim for a UTI, he reports still taking his Bactrim. He denies any CP or SOB no N/V/D no cough fever or chills. Pt denies any recent travel or sick contacts.

## 2022-10-03 ENCOUNTER — EMERGENCY (EMERGENCY)
Facility: HOSPITAL | Age: 52
LOS: 1 days | Discharge: HOME | End: 2022-10-03
Attending: EMERGENCY MEDICINE
Payer: COMMERCIAL

## 2022-10-03 VITALS
OXYGEN SATURATION: 99 % | RESPIRATION RATE: 18 BRPM | DIASTOLIC BLOOD PRESSURE: 85 MMHG | HEART RATE: 56 BPM | SYSTOLIC BLOOD PRESSURE: 127 MMHG | TEMPERATURE: 98 F

## 2022-10-03 VITALS
SYSTOLIC BLOOD PRESSURE: 146 MMHG | DIASTOLIC BLOOD PRESSURE: 85 MMHG | OXYGEN SATURATION: 99 % | HEART RATE: 72 BPM | HEIGHT: 66 IN | TEMPERATURE: 98 F | RESPIRATION RATE: 18 BRPM | WEIGHT: 160.06 LBS

## 2022-10-03 DIAGNOSIS — Z98.890 OTHER SPECIFIED POSTPROCEDURAL STATES: Chronic | ICD-10-CM

## 2022-10-03 LAB
ALBUMIN SERPL ELPH-MCNC: 4.3 G/DL — SIGNIFICANT CHANGE UP (ref 3.3–5)
ALP SERPL-CCNC: 62 U/L — SIGNIFICANT CHANGE UP (ref 40–120)
ALT FLD-CCNC: 36 U/L — SIGNIFICANT CHANGE UP (ref 10–45)
ANION GAP SERPL CALC-SCNC: 10 MMOL/L — SIGNIFICANT CHANGE UP (ref 5–17)
APPEARANCE UR: ABNORMAL
AST SERPL-CCNC: 18 U/L — SIGNIFICANT CHANGE UP (ref 10–40)
BACTERIA # UR AUTO: NEGATIVE — SIGNIFICANT CHANGE UP
BASOPHILS # BLD AUTO: 0.09 K/UL — SIGNIFICANT CHANGE UP (ref 0–0.2)
BASOPHILS NFR BLD AUTO: 0.9 % — SIGNIFICANT CHANGE UP (ref 0–2)
BILIRUB SERPL-MCNC: 0.3 MG/DL — SIGNIFICANT CHANGE UP (ref 0.2–1.2)
BILIRUB UR-MCNC: NEGATIVE — SIGNIFICANT CHANGE UP
BUN SERPL-MCNC: 11 MG/DL — SIGNIFICANT CHANGE UP (ref 7–23)
CALCIUM SERPL-MCNC: 9.1 MG/DL — SIGNIFICANT CHANGE UP (ref 8.4–10.5)
CHLORIDE SERPL-SCNC: 104 MMOL/L — SIGNIFICANT CHANGE UP (ref 96–108)
CO2 SERPL-SCNC: 25 MMOL/L — SIGNIFICANT CHANGE UP (ref 22–31)
COLOR SPEC: COLORLESS — SIGNIFICANT CHANGE UP
CREAT SERPL-MCNC: 0.78 MG/DL — SIGNIFICANT CHANGE UP (ref 0.5–1.3)
DIFF PNL FLD: ABNORMAL
EGFR: 109 ML/MIN/1.73M2 — SIGNIFICANT CHANGE UP
EOSINOPHIL # BLD AUTO: 0 K/UL — SIGNIFICANT CHANGE UP (ref 0–0.5)
EOSINOPHIL NFR BLD AUTO: 0 % — SIGNIFICANT CHANGE UP (ref 0–6)
EPI CELLS # UR: 0 /HPF — SIGNIFICANT CHANGE UP
GLUCOSE SERPL-MCNC: 147 MG/DL — HIGH (ref 70–99)
GLUCOSE UR QL: NEGATIVE — SIGNIFICANT CHANGE UP
HCT VFR BLD CALC: 38.8 % — LOW (ref 39–50)
HGB BLD-MCNC: 12.6 G/DL — LOW (ref 13–17)
HYALINE CASTS # UR AUTO: 0 /LPF — SIGNIFICANT CHANGE UP (ref 0–2)
KETONES UR-MCNC: NEGATIVE — SIGNIFICANT CHANGE UP
LEUKOCYTE ESTERASE UR-ACNC: ABNORMAL
LYMPHOCYTES # BLD AUTO: 2.06 K/UL — SIGNIFICANT CHANGE UP (ref 1–3.3)
LYMPHOCYTES # BLD AUTO: 21.2 % — SIGNIFICANT CHANGE UP (ref 13–44)
MANUAL SMEAR VERIFICATION: SIGNIFICANT CHANGE UP
MCHC RBC-ENTMCNC: 27.3 PG — SIGNIFICANT CHANGE UP (ref 27–34)
MCHC RBC-ENTMCNC: 32.5 GM/DL — SIGNIFICANT CHANGE UP (ref 32–36)
MCV RBC AUTO: 84.2 FL — SIGNIFICANT CHANGE UP (ref 80–100)
MONOCYTES # BLD AUTO: 0.43 K/UL — SIGNIFICANT CHANGE UP (ref 0–0.9)
MONOCYTES NFR BLD AUTO: 4.4 % — SIGNIFICANT CHANGE UP (ref 2–14)
MYELOCYTES NFR BLD: 1.8 % — HIGH (ref 0–0)
NEUTROPHILS # BLD AUTO: 6.96 K/UL — SIGNIFICANT CHANGE UP (ref 1.8–7.4)
NEUTROPHILS NFR BLD AUTO: 71.7 % — SIGNIFICANT CHANGE UP (ref 43–77)
NITRITE UR-MCNC: NEGATIVE — SIGNIFICANT CHANGE UP
PH UR: 6.5 — SIGNIFICANT CHANGE UP (ref 5–8)
PLAT MORPH BLD: NORMAL — SIGNIFICANT CHANGE UP
PLATELET # BLD AUTO: 299 K/UL — SIGNIFICANT CHANGE UP (ref 150–400)
POTASSIUM SERPL-MCNC: 4.4 MMOL/L — SIGNIFICANT CHANGE UP (ref 3.5–5.3)
POTASSIUM SERPL-SCNC: 4.4 MMOL/L — SIGNIFICANT CHANGE UP (ref 3.5–5.3)
PROT SERPL-MCNC: 7.2 G/DL — SIGNIFICANT CHANGE UP (ref 6–8.3)
PROT UR-MCNC: NEGATIVE — SIGNIFICANT CHANGE UP
RBC # BLD: 4.61 M/UL — SIGNIFICANT CHANGE UP (ref 4.2–5.8)
RBC # FLD: 13.2 % — SIGNIFICANT CHANGE UP (ref 10.3–14.5)
RBC BLD AUTO: NORMAL — SIGNIFICANT CHANGE UP
RBC CASTS # UR COMP ASSIST: 15 /HPF — HIGH (ref 0–4)
SODIUM SERPL-SCNC: 139 MMOL/L — SIGNIFICANT CHANGE UP (ref 135–145)
SP GR SPEC: 1.01 — SIGNIFICANT CHANGE UP (ref 1.01–1.02)
UROBILINOGEN FLD QL: NEGATIVE — SIGNIFICANT CHANGE UP
WBC # BLD: 9.71 K/UL — SIGNIFICANT CHANGE UP (ref 3.8–10.5)
WBC # FLD AUTO: 9.71 K/UL — SIGNIFICANT CHANGE UP (ref 3.8–10.5)
WBC UR QL: 203 /HPF — HIGH (ref 0–5)

## 2022-10-03 PROCEDURE — 87086 URINE CULTURE/COLONY COUNT: CPT

## 2022-10-03 PROCEDURE — 80053 COMPREHEN METABOLIC PANEL: CPT

## 2022-10-03 PROCEDURE — 85025 COMPLETE CBC W/AUTO DIFF WBC: CPT

## 2022-10-03 PROCEDURE — 99284 EMERGENCY DEPT VISIT MOD MDM: CPT | Mod: 25

## 2022-10-03 PROCEDURE — 81001 URINALYSIS AUTO W/SCOPE: CPT

## 2022-10-03 PROCEDURE — 99284 EMERGENCY DEPT VISIT MOD MDM: CPT

## 2022-10-03 PROCEDURE — 96374 THER/PROPH/DIAG INJ IV PUSH: CPT

## 2022-10-03 PROCEDURE — 87186 SC STD MICRODIL/AGAR DIL: CPT

## 2022-10-03 RX ORDER — CEFTRIAXONE 500 MG/1
1000 INJECTION, POWDER, FOR SOLUTION INTRAMUSCULAR; INTRAVENOUS ONCE
Refills: 0 | Status: COMPLETED | OUTPATIENT
Start: 2022-10-03 | End: 2022-10-03

## 2022-10-03 RX ORDER — CEFPODOXIME PROXETIL 100 MG
1 TABLET ORAL
Qty: 28 | Refills: 0
Start: 2022-10-03 | End: 2022-10-16

## 2022-10-03 RX ADMIN — CEFTRIAXONE 100 MILLIGRAM(S): 500 INJECTION, POWDER, FOR SOLUTION INTRAMUSCULAR; INTRAVENOUS at 12:14

## 2022-10-03 NOTE — ED PROVIDER NOTE - CLINICAL SUMMARY MEDICAL DECISION MAKING FREE TEXT BOX
"Procedure: Lumbar Transforaminal Epidural Steroid Injection under Fluoroscopic Guidance (supraneural approach)    Level: L5/S1     Side: Bilateral    PROCEDURE DATE: 3/29/2019    Pre-operative Diagnosis: Lumbar Radiculopathy  Post-operative Diagnosis: Lumbar Radiculopathy    Provider: KEENAN Fierro MD  Assistant(s): None    Anesthesia: Local, No Sedation    >> 0 mg of VERSED    >> 0 mcg of FENTANYL     Indication: Low back pain with radiculopathy consistent with distribution of targeted nerve. Symptoms unresponsive to conservative treatments. Fluoroscopy was used to optimize visualization of needle placement and to maximize safety.     Procedure Description / Technique:  The patient was seen and identified in the preoperative area. Risks, benefits, complications, and alternatives were discussed with the patient. The patient agreed to proceed with the procedure and signed the consent. The site and side of the procedure was identified and marked. An IV was not placed for this procedure. The patient was taken to the procedural suite.    The patient was positioned in prone orientation on procedure table and a pillow was placed under the abdomen to reduce lumbar lordosis. A time out was performed prior to any intervention. The procedure, site, side, and allergies were stated and agreed to by all present. The lumbosacral area was widely prepped with ChloraPrep. The procedural site was draped in usual sterile fashion. Vital signs were closely monitored throughout this procedure. Conscious sedation was not used for this procedure.    The target area was visualized under fluoroscopy. The cephalocaudal angle of the fluoroscope was adjusted as to align the vertebral end plates. The fluoroscopic arm was rotated ipsilaterally to an angle of approximately 30 degrees until the "charlotte dog" outline came into view and the tip of the inferior superior articular process pointed towards the midline, 6:00 position of the above " "pedicle. A 22 gauge 5 inch spinal needle was directed towards the "chin" of the "charlotte dog" (adjacent to the pars interarticularis and inferior to the pedicle). The needle was advanced until OS was met at the inferior border of the pedicle / pars interface. The needle was adjusted so that it would pass inferior to the osseous border. The fluoroscope was then placed in the lateral position and the needle was slowly advanced until it rested in the posterior 1/3rd of the vertebral foramen. AP fluoroscopy was checked and the needle tip rested at the 6:00 position under the pedicle. No paresthesia was elicited during needle placement. With the needle tip in its final position, gentle aspiration was negative for blood and CSF. Gadavist (gadobutrol) 1 mmol/mL (1 to 2 mL) was injected under live fluoroscopy. Microbore tubing was used for injection. There was no pain or paresthesia on injection. The contrast clearly delineated the targeted nerve root on AP fluoroscopy. No vascular uptake was seen. A solution containing 2 mL of 1% PF Lidocaine and 2 mL of Dexamethasone (10 mg/mL) was mixed and 2 mL was injected slowly at each level targeted. There was minimal resistance on injection. No pain or paresthesia was elicited on injection. The stylet was replaced and the needle was withdrawn intact. This procedure was performed for each of the above indicated levels.     Description of Findings: Not applicable    Prosthetic devices, grafts, tissues, or devices implanted: None    Specimen Removed: No    Estimated Blood Loss: minimal    COMPLICATIONS: None    DISPOSITION / PLANS: The patient was transferred to the recovery area in a stable condition for observation. The patient was reexamined prior to discharge. There was no evidence of acute neurologic injury following the procedure.  Patient was discharged from the recovery room after meeting discharge criteria. Home discharge instructions were given to the patient by the " staff.     see attending attestation

## 2022-10-03 NOTE — ED ADULT NURSE NOTE - OBJECTIVE STATEMENT
pt 53 yo male presents with return of lower pelvic disomfort last 2 days recently here for similar complaint and oncipro he states for 6 weeks for uti after pt had allergic reaction to first antibiotic prescribed pt has had no follow up with urology as he was instructed pt denies fever or chills no back pain states urine with cloudy appearance with blood noted last 2 days states pain comes intermittently to lower mid abd area no vomiting

## 2022-10-03 NOTE — ED PROVIDER NOTE - OBJECTIVE STATEMENT
52y M pmhx preDM presents to ED c/o cloudy urine, hematuria, dysuria x 3 days w/ mild suprapubic abd pain. Currently being treated for prostatitis on Cipro. Taking abx as prescribed. Dx prostatitis on CT 11/12, rx'd bactrim, came back to ED c/o rash on 12/24 and switched to cipro. Rash now resolved. Also reports was previously on cipro for 1 week prior to 11/12 after being dx w/ UTI at [facility]. Endorsing night sweats but no f/c. Denies urinary retention, penile DC, testicular pain, pain w/ BMs or prolonged sitting. Has not been seen by uro or pmd.

## 2022-10-03 NOTE — ED PROVIDER NOTE - PATIENT PORTAL LINK FT
You can access the FollowMyHealth Patient Portal offered by [facility] by registering at the following website: [email or web address]. By joining [employer]’s FollowMyHealth portal, you will also be able to view your health information using other applications (apps) compatible with our system.

## 2022-10-03 NOTE — ED PROVIDER NOTE - BIRTH SEX
Male

## 2022-10-03 NOTE — ED PROVIDER NOTE - NSFOLLOWUPINSTRUCTIONS_ED_ALL_ED_FT
Follow-up with your PCP in 1-3 days.  Follow-up with urology for your schedule appointment with Dr. Figueroa on Wednesday.    Dr. Benson Figueroa  Address: [address] [place] [address] [address] [facility], [place] [address]  Phone: [phone]    Read all discharge instructions    Take cefpodoxime as directed. Stop taking ciprofloxacin.    Continue to take all other medications as directed.    Return to the emergency room if you have high fevers, shaking chills, headache, back/side pain, difficulty urinating, vomiting and unable to keep food or water down, or if any other concerning or questionable symptoms.

## 2022-10-03 NOTE — ED PROVIDER NOTE - ATTENDING APP SHARED VISIT CONTRIBUTION OF CARE
RGUJRAL 51yo male seen at [facility] for dysuria on 9/12 and treated with Bactrim for prostatitis. Pt states he was feeling better when he developed rash and was evaluated on 9/24 and switched to cipro. Pt has been taking cipro x 1 week and now has recurrent dysuria with some blood and mild pain with urination. Denies n/v/d, pain w defecation, discharge, fever. + sweats at night. No abd pain/back pain, tolerating PO. Pt states he was seen at  prior to his initial ED visit and treated with cipro x 1 week wo any improvement. Pt is sexually active with 1 partner, denies any discharge.   On exam, Patient is awake,alert,oriented x 3. Patient is well appearing and in no acute distress. Patient's chest is clear to ausculation, +s1s2. Abdomen is soft nd/nt +BS. Extremity with no swelling or calf tenderness.  note see PA note. No cvat.   Pt vital signs stable, no rectal pain or discomfort. Able to urinate completely.  Check labs including UA, no prior culture. Pt was contacted for Urology follow up but was not able to follow up.   CT reviewed, symptoms also correlate with UTI/Cystitis. Pt w worsening symptoms, will change antibiotics to Rocephin as he failed outpatient cipro prior as well. Will provide follow up with Urology in ED and follow up urine culture.

## 2022-10-03 NOTE — ED PROVIDER NOTE - PHYSICAL EXAMINATION
GEN: Pt non-toxic in NAD, A&Ox3.  PSYCH: Affect and mood appropriate.  EYES: Sclera white w/o injection, EOMI, PERRLA.   ENT: MMM. Neck supple FROM. Airway patent.  RESP: CTA b/l, no wheezes, rales, or rhonchi.   CARDIAC: RRR, clear distinct S1, S2, no appreciable murmurs.  ABD: Abdomen soft, non-tender. No CVAT b/l.  GENITAL: Chaperone: Dr. Storm . Genitalia unremarkable no vesicles, ulcers, chancres, or other lesions. No penile pain or DC. No scrotal swelling or high riding testicles. No testicular pain. No hernias palpated at rest or with Valsalva maneuver.   MSK: Moving all extremities.  NEURO: No focal motor or sensory deficits.  VASC: Radial pulses 2+ b/l. No edema.  SKIN: No rashes or lesions.

## 2022-10-05 ENCOUNTER — APPOINTMENT (OUTPATIENT)
Dept: UROLOGY | Facility: CLINIC | Age: 52
End: 2022-10-05

## 2022-10-05 NOTE — ED POST DISCHARGE NOTE - RESULT SUMMARY
UCx Ecoli on Cefpodoxmine pending sensitivities PATRICIA Lopes UCx Ecoli on Cefpodoxmine sufficient tx no further intervention JANET Neil PGY2

## 2022-10-07 ENCOUNTER — APPOINTMENT (OUTPATIENT)
Dept: UROLOGY | Facility: CLINIC | Age: 52
End: 2022-10-07

## 2022-10-07 VITALS — SYSTOLIC BLOOD PRESSURE: 119 MMHG | DIASTOLIC BLOOD PRESSURE: 80 MMHG

## 2022-10-07 DIAGNOSIS — N41.0 ACUTE PROSTATITIS: ICD-10-CM

## 2022-10-07 PROCEDURE — 51741 ELECTRO-UROFLOWMETRY FIRST: CPT

## 2022-10-07 PROCEDURE — 99204 OFFICE O/P NEW MOD 45 MIN: CPT

## 2022-10-07 PROCEDURE — 51798 US URINE CAPACITY MEASURE: CPT

## 2022-10-07 RX ORDER — CEFPODOXIME PROXETIL 200 MG/1
200 TABLET, FILM COATED ORAL TWICE DAILY
Qty: 42 | Refills: 0 | Status: ACTIVE | COMMUNITY
Start: 2022-10-07 | End: 1900-01-01

## 2022-10-07 RX ORDER — TAMSULOSIN HYDROCHLORIDE 0.4 MG/1
0.4 CAPSULE ORAL
Qty: 90 | Refills: 3 | Status: ACTIVE | COMMUNITY
Start: 2022-10-07 | End: 1900-01-01

## 2022-10-10 NOTE — HISTORY OF PRESENT ILLNESS
[FreeTextEntry1] : Language: English\par Date of First visit: 10/7/2022\par Accompanied by: self\par Contact info: \par Referring Provider/PCP: Dr. Delfin Lehman\par T: [phone] - left message to obtain fax\par Fax: \par \par \par CC/ Problem List:\par UTI/ Dysuria\par ===============================================================================\par FIRST VISIT / Summary:\par Very pleasant 52 yr old M about 5 weeks ago went to urgent care, took abx for 7d then had recurrence and went to [facility] emergency room where was told had a bad infection. He took Bactrim for 2 weeks and developed a rash, then was on cipro for 1 week and urine became cloudy, now on cefpodoxime since 10/4. He was given 2 weeks.\par \par no n/v/f/c, no constipation, tolerating this abx well.\par -------------------------------------------------------------------------------------------\par INTERVAL VISITS:\par \par ===============================================================================\par \par PMH: prediabetic\par Meds: no meds\par All: Bactrim (rash)\par FHx: noncontributory\par SocHx: \par \par PSH: right inguinal hernia 2017\par \par \par ROS: Review of Systems is as per HPI unless otherwise denoted below\par \par \par ===============================================================================\par DATA: \par \par LABS (SELECTED):-------------------------------------------------------------------------------------------------------------------\par 10/3/2022: urine culture: E coli R to Bactrim / cipro / ampicillin sensitive to augmentin and cephalosporins. UA with large LE \par \par RADS:-------------------------------------------------------------------------------------------------------------------\par 10/3/2022: CT abdomen/pelvis at [facility] reviewed: normal kidneys, no stones, hydro, or masses. Prostate with surrounding inflammation, approx 60g by my calculations. Some calcifications.\par \par PATHOLOGY/CYTOLOGY:-------------------------------------------------------------------------------------------\par \par \par VOIDING STUDIES: ----------------------------------------------------------------------------------------------------\par 10/7/2022: Uroflow: voided 84cc, with peak flow 8.1 voiding over 20 seconds with some plateauing despite low volume. PVR 0\par \par STONE STUDIES: (Analysis/LLSA)----------------------------------------------------------------------------------\par \par \par \par PROCEDURES: -----------------------------------------------------------------------------------------------\par \par \par \par \par ===============================================================================\par \par PHYSICAL EXAM:\par \par GEN: AAOx3, NAD; \par \par BARRIERS to CARE: none\par \par PSYCH: Appropriate Behavior, Affect Congruent\par \par HEENT: AT/NC Trachea midline. EOMI.\par \par Lungs: No labored breathing.\par \par NEURO: + Movement, all 4 extremities grossly intact without deficits.\par \par SKIN: Warm dry. No visible rashes or ulcers\par \par GAIT: Gait normal, Stability good\par \par ABD: no suprapubic or CVAT\par \par \par  FOCUSED: --------------(done xx/xx/xxxx)------------------------------------------------------------------------\par \par \par =======================================================================================\par \par ASSESSMENT and PLAN\par \par \par 1. Prostatitis\par - extend course of cephalosporin to 4 weeks. He only just started.\par - follow-up in 4 weeks\par 2. LUTS / BPH\par - start tamsulosin. discussed SE and given written information\par - discussed finasteride as possible addition in future\par 3. PSA screening\par - hold off in setting of prostatitis, plan for in 1 year\par \par \par =======================================================================================\par \par Thank you for allowing me to assist in the care of your patient. Should you have any questions please do not hesitate to reach out to me.\par \par \par Benson Figueroa MD\par [facility]\par [facility] [place]\par [address], [address]\[place] [address]\par T: [phone]\par F: [phone]

## 2022-10-21 ENCOUNTER — NON-APPOINTMENT (OUTPATIENT)
Age: 52
End: 2022-10-21

## 2022-11-04 ENCOUNTER — APPOINTMENT (OUTPATIENT)
Dept: UROLOGY | Facility: CLINIC | Age: 52
End: 2022-11-04

## 2022-11-04 VITALS
WEIGHT: 161 LBS | BODY MASS INDEX: 25.88 KG/M2 | HEIGHT: 66 IN | RESPIRATION RATE: 16 BRPM | DIASTOLIC BLOOD PRESSURE: 98 MMHG | HEART RATE: 69 BPM | SYSTOLIC BLOOD PRESSURE: 155 MMHG | OXYGEN SATURATION: 98 % | TEMPERATURE: 98.6 F

## 2022-11-04 DIAGNOSIS — R39.89 OTHER SYMPTOMS AND SIGNS INVOLVING THE GENITOURINARY SYSTEM: ICD-10-CM

## 2022-11-04 PROCEDURE — 99214 OFFICE O/P EST MOD 30 MIN: CPT

## 2022-11-04 NOTE — HISTORY OF PRESENT ILLNESS
[FreeTextEntry1] : Language: English\par Date of First visit: 10/7/2022\par Accompanied by: self\par Contact info: \par Referring Provider/PCP: Dr. Delfin Lehman\par T: [phone] - left message to obtain fax\par Fax: \par \par \par CC/ Problem List:\par \par UTI/ Dysuria\par PSA screening\par \par ===============================================================================\par FIRST VISIT / Summary:\par Very pleasant 52 yr old M about 5 weeks ago went to urgent care, took abx for 7d then had recurrence and went to [facility] emergency room where was told had a bad infection. He took Bactrim for 2 weeks and developed a rash, then was on cipro for 1 week and urine became cloudy (Cx shows resistant), now on cefpodoxime since 10/4. He was given 2 weeks.\par \par no n/v/f/c, no constipation, tolerating this abx well.\par -------------------------------------------------------------------------------------------\par INTERVAL VISITS:\par Mr Jade returns 11/4 now after antibiotics course. He is now s/p cephalosporin around 3 weeks developed a rash and stopped. Also started tamsulosin but stopped that. This may be the cause of rash as contains sulfa, he was advised to stop.\par \par He is now urinating fine, sometimes has slower stream but minimal bother. We discussed finasteride but he is unsure and will wait until after we check PSA/MRI so we have baseline before medication.\par \par ===============================================================================\par \par PMH: prediabetic\par Meds: no meds\par All: Bactrim (rash) tamsulosin (rash)\par FHx: noncontributory\par SocHx: \par \par PSH: right inguinal hernia 2017\par \par \par ROS: Review of Systems is as per HPI unless otherwise denoted below\par \par \par ===============================================================================\par DATA: \par \par LABS (SELECTED):-------------------------------------------------------------------------------------------------------------------\par 10/3/2022: urine culture: E coli R to Bactrim / cipro / ampicillin sensitive to augmentin and cephalosporins. UA with large LE \par \par RADS:-------------------------------------------------------------------------------------------------------------------\par 10/3/2022: CT abdomen/pelvis at [facility] reviewed: normal kidneys, no stones, hydro, or masses. Prostate with surrounding inflammation, approx 60g by my calculations. Some calcifications.\par \par PATHOLOGY/CYTOLOGY:-------------------------------------------------------------------------------------------\par \par \par VOIDING STUDIES: ----------------------------------------------------------------------------------------------------\par 10/7/2022: Uroflow: voided 84cc, with peak flow 8.1 voiding over 20 seconds with some plateauing despite low volume. PVR 0\par \par STONE STUDIES: (Analysis/LLSA)----------------------------------------------------------------------------------\par \par \par PROCEDURES: -----------------------------------------------------------------------------------------------\par \par \par \par ===============================================================================\par \par PHYSICAL EXAM:\par \par GEN: AAOx3, NAD; \par \par BARRIERS to CARE: none\par \par PSYCH: Appropriate Behavior, Affect Congruent\par \par HEENT: AT/NC Trachea midline. EOMI.\par \par Lungs: No labored breathing.\par \par NEURO: + Movement, all 4 extremities grossly intact without deficits.\par \par SKIN: Warm dry. No visible rashes or ulcers\par \par GAIT: Gait normal, Stability good\par \par ABD: no suprapubic or CVAT\par \par \par  FOCUSED: --------------(done 11/4/2022)------------------------------------------------------------------------\par \par Prostate: mildly enlarged approx 50g, nontender, symmetric, no nodules, firm on left side\par \par =======================================================================================\par \par ASSESSMENT and PLAN\par \par \par 1. Prostatitis\par - Cx with E coli resistant to multiple antibiotics\par - now s/p cephalosporin around 3 weeks developed a rash and stopped abx and tamsulosin\par \par 2. LUTS / BPH\par - started tamsulosin however developed rash. Improved with zyrtec though \par - discussed finasteride as possible addition in future\par \par 3. PSA screening\par - hold off in setting of prostatitis, still pending receipt of labs from PMD\par - abnormal prostate exam, ordered PSA and MRI prostate for 1 month from now\par - given DAGMAR he will get further evaluation. Induration may be related to prostatitis\par - follow-up in early january after MRI and PSA\par \par \par \par =======================================================================================\par \par Thank you for allowing me to assist in the care of your patient. Should you have any questions please do not hesitate to reach out to me.\par \par \par Benson Figueroa MD\par [facility]\par [facility] [place]\par [address], [address]\[place] [address]\par T: [phone]\par F: [phone]

## 2023-06-05 NOTE — ED ADULT NURSE NOTE - NSICDXFAMILYHX_GEN_ALL_CORE_FT
VTE Assessment already completed for this visit
FAMILY HISTORY:  Father  Still living? Unknown  FH: diabetes mellitus, Age at diagnosis: Age Unknown

## 2023-10-06 ENCOUNTER — APPOINTMENT (OUTPATIENT)
Dept: UROLOGY | Facility: CLINIC | Age: 53
End: 2023-10-06

## 2023-12-28 NOTE — ED ADULT TRIAGE NOTE - RESPIRATORY RATE (BREATHS/MIN)
Additional Comments (Use Complete Sentences): The acne has been present for two years. He describes his acne as both cystic and superficial pimples. He has never been prescribed medication for his acne. He has tried curology but he wasn’t very consistent with it. He states his skin is oily. He currently uses a cleanser, moisturizer in AM and curology face wash, toner, and moisturizer. 20

## 2024-03-01 NOTE — ED ADULT TRIAGE NOTE - HEIGHT IN FEET
"GENERAL GI PATIENT INTAKE:    COVID symptoms in the last 7 days (runny nose, sore throat, congestion, cough, fever): No  PCP: Joss Suazo  If not PCP-  number given to establish [phone]: N/A    ALLERGIES REVIEWED:  N/A    CHIEF COMPLAINT:    Chief Complaint   Patient presents with    GI Problem       VITAL SIGNS:  /86   Pulse 90   Ht 5' 6" (1.676 m)   Wt 96.6 kg (213 lb)   BMI 34.38 kg/m²      Change in medical, surgical, family or social history: No      REVIEWED MEDICATION LIST RECONCILED INCLUDING ABOVE MEDS:  Yes     " 5

## 2024-03-04 NOTE — ED PROVIDER NOTE - CONSTITUTIONAL, MLM
normal... Well appearing, awake, alert, oriented to person, place, time/situation and in no apparent distress. Current every day smoker

## 2024-04-28 NOTE — ED ADULT TRIAGE NOTE - INTERNATIONAL TRAVEL
No
Anxiety    Generalized anxiety disorder (SHANNON) is a mental disorder. It is defined as anxiety that is not necessarily related to specific events or activities or is out of proportion to said events. Symptoms include restlessness, fatigue, difficulty concentrations, irritability and difficulty concentrating. It interferes with life functions, including relationships, work, and school. If you were started on a medication make sure to take exactly as prescribed and follow up with a psychiatrist.    SEEK IMMEDIATE MEDICAL CARE IF YOU HAVE THE FOLLOWING SYMPTOMS: thoughts about hurting killing yourself, thoughts about hurting or killing somebody else, hallucinations, or worsening depression.

## 2025-02-10 NOTE — ED PROVIDER NOTE - AXIS

## 2025-02-24 NOTE — DISCHARGE NOTE PROVIDER - NSDCFUADDAPPT_GEN_ALL_CORE_FT
Discharge instructions reviewed with patient and significant other. Instructed pt to follow up with Dr. Campos in 2 weeks. Appointment details given to patient. Patient verbalized understanding of all discharge instructions and denies any questions or concerns at this time. Patient stable and will be wheeled out to home with family when ready.    Follow up with Dr. Jasso scheduled for Tuesday, July 21st at 9:40AM